# Patient Record
Sex: FEMALE | Race: WHITE | Employment: UNEMPLOYED | URBAN - METROPOLITAN AREA
[De-identification: names, ages, dates, MRNs, and addresses within clinical notes are randomized per-mention and may not be internally consistent; named-entity substitution may affect disease eponyms.]

---

## 2017-01-16 ENCOUNTER — OFFICE VISIT (OUTPATIENT)
Dept: FAMILY MEDICINE | Facility: CLINIC | Age: 32
End: 2017-01-16

## 2017-01-16 VITALS
DIASTOLIC BLOOD PRESSURE: 78 MMHG | HEART RATE: 67 BPM | BODY MASS INDEX: 21.26 KG/M2 | OXYGEN SATURATION: 97 % | SYSTOLIC BLOOD PRESSURE: 102 MMHG | WEIGHT: 120 LBS | HEIGHT: 63 IN

## 2017-01-16 DIAGNOSIS — F32.5 MAJOR DEPRESSIVE DISORDER WITH SINGLE EPISODE, IN FULL REMISSION (H): ICD-10-CM

## 2017-01-16 DIAGNOSIS — F41.9 ANXIETY: ICD-10-CM

## 2017-01-16 DIAGNOSIS — Z76.0 ENCOUNTER FOR MEDICATION REFILL: Primary | ICD-10-CM

## 2017-01-16 PROCEDURE — 99213 OFFICE O/P EST LOW 20 MIN: CPT | Performed by: FAMILY MEDICINE

## 2017-01-16 RX ORDER — MINOCYCLINE HYDROCHLORIDE 100 MG/1
100 CAPSULE ORAL PRN
Refills: 0 | COMMUNITY
Start: 2016-12-10 | End: 2017-12-14

## 2017-01-16 RX ORDER — ADAPALENE 0.1 G/100G
CREAM TOPICAL
Refills: 4 | COMMUNITY
Start: 2016-12-10 | End: 2018-08-16

## 2017-01-16 RX ORDER — BENZOYL PEROXIDE 50 MG/ML
LIQUID TOPICAL
Refills: 4 | COMMUNITY
Start: 2016-12-12 | End: 2018-08-16

## 2017-01-16 NOTE — PROGRESS NOTES
"In office for refill of Sertraline and Xanax.     Break 50s in half, take 25mg.     Use Xanax 1 pill 2-3 times a week. On Sundays, in the mornings.     Problem(s) Oriented visit      ROS:  General and Resp. completed and negative except as noted above     HISTORY:   reports that she drinks about 2.0 oz of alcohol per week.   reports that she has never smoked. She has never used smokeless tobacco.    No past medical history on file.  No past surgical history on file.    EXAM:  BP: 102/78   Pulse: 67    Temp: Data Unavailable    Wt Readings from Last 2 Encounters:   01/16/17 54.432 kg (120 lb)   09/15/15 53.978 kg (119 lb)       BMI= Body mass index is 21.26 kg/(m^2).    EXAM:  APPEARANCE: = Relaxed and in no distress  Conj/Eyelids = noninjected and lids and lashes are without inflammation  PERRLA/Irises = Pupils Round Reactive to Light and Irisis without inflammation  Ears/Nose = External structures and Nares have usual shape and form  Ear canals and TM's = Canals are not inflammed and have none or little wax and the drums are not injected and have a light reflex   Lips/Teeth/Gums = No lesions seen, good dentition, and gums seem healthy  Oropharynx = No leukoplakia, No injection to the tissues, Normal Uvula  Neck = No anterior or posterior adenopathy appreciated.  Resp effort = Calm regular breathing  Breath Sounds = Good air movement with no rales or rhonchi in any lung fields  Mood/Affect = Cooperative and interested      Assessment/Plan:  Nan was seen today for recheck medication.    Diagnoses and all orders for this visit:    Encounter for medication refill    Anxiety    Major depressive disorder with single episode, in full remission (H)        COUNSELING:   reports that she has never smoked. She has never used smokeless tobacco.    Estimated body mass index is 21.26 kg/(m^2) as calculated from the following:    Height as of this encounter: 1.6 m (5' 3\").    Weight as of this encounter: 54.432 kg (120 lb). "       Appropriate preventive services were discussed with this patient, including applicable screening as appropriate for cardiovascular disease, diabetes, osteopenia/osteoporosis, and glaucoma.  As appropriate for age/gender, discussed screening for colorectal cancer, prostate cancer, breast cancer, and cervical cancer. Checklist reviewing preventive services available has been given to the patient.    Reviewed patients plan of care and provided an AVS. The Basic Care Plan (routine screening as documented in Health Maintenance) for Nan meets the Care Plan requirement. This Care Plan has been established and reviewed with the  Patient.      The following health maintenance items are reviewed in Epic and correct as of today:  Health Maintenance   Topic Date Due     PAP SCREENING Q3 YR (SYSTEM ASSIGNED)  04/01/2015     INFLUENZA VACCINE (SYSTEM ASSIGNED)  09/01/2016     TETANUS IMMUNIZATION (SYSTEM ASSIGNED)  05/07/2023       Chico Alvarenga  [facility]  For any issues my office # is [phone]

## 2017-01-16 NOTE — MR AVS SNAPSHOT
"              After Visit Summary   1/16/2017    Nan Singleton    MRN: [id number]           Patient Information     Date Of Birth          1985        Visit Information        Provider Department      1/16/2017 9:00 AM Chico Alvarenga MD [facility]        Today's Diagnoses     Encounter for medication refill    -  1     Anxiety         Major depressive disorder with single episode, in full remission (H)            Follow-ups after your visit        Who to contact     If you have questions or need follow up information about today's clinic visit or your schedule please contact [facility] directly at [phone].  Normal or non-critical lab and imaging results will be communicated to you by MyChart, letter or phone within 4 business days after the clinic has received the results. If you do not hear from us within 7 days, please contact the clinic through [employer] or phone. If you have a critical or abnormal lab result, we will notify you by phone as soon as possible.  Submit refill requests through [employer] or call your pharmacy and they will forward the refill request to us. Please allow 3 business days for your refill to be completed.          Additional Information About Your Visit        MyChart Information     [employer] gives you secure access to your electronic health record. If you see a primary care provider, you can also send messages to your care team and make appointments. If you have questions, please call your primary care clinic.  If you do not have a primary care provider, please call [phone] and they will assist you.        Care EveryWhere ID     This is your Care EveryWhere ID. This could be used by other organizations to access your [place] medical records  [id number]        Your Vitals Were     Pulse Height BMI (Body Mass Index) Pulse Oximetry          67 1.6 m (5' 3\") 21.26 kg/m2 97%         Blood Pressure from Last 3 Encounters:   01/16/17 102/78 "   09/15/15 102/68   05/07/13 100/62    Weight from Last 3 Encounters:   01/16/17 54.432 kg (120 lb)   09/15/15 53.978 kg (119 lb)   09/22/14 54.885 kg (121 lb)              Today, you had the following     No orders found for display       Primary Care Provider Office Phone # Fax #    Chico Alvarenga [phone]       [facility] [address]  [facility] [address]        Thank you!     Thank you for choosing [facility]  for your care. Our goal is always to provide you with excellent care. Hearing back from our patients is one way we can continue to improve our services. Please take a few minutes to complete the written survey that you may receive in the mail after your visit with us. Thank you!             Your Updated Medication List - Protect others around you: Learn how to safely use, store and throw away your medicines at www.disposemymeds.org.          This list is accurate as of: 1/16/17  9:31 AM.  Always use your most recent med list.                   Brand Name Dispense Instructions for use    adapalene 0.1 % cream    DIFFERIN     APPLY TO THE AFFECTED AREAS OF THE SKIN QHS       ALPRAZolam 0.5 MG tablet    XANAX    30 tablet    TAKE 1 TABLET BY MOUTH THREE TIMES DAILY AS NEEDED FOR ANXIETY       BENZOYL PEROXIDE WASH 5 % Liqd   Generic drug:  benzoyl peroxide      WASH THE AFFECTED SKIN AREAS GENTLY BID       levonorgestrel 20 MCG/24HR IUD    MIRENA     1 each by Intrauterine route once       minocycline 100 MG capsule    MINOCIN/DYNACIN         sertraline 50 MG tablet    ZOLOFT    30 tablet    TAKE 1 TABLET BY MOUTH EVERY DAY

## 2017-01-17 ASSESSMENT — PATIENT HEALTH QUESTIONNAIRE - PHQ9: SUM OF ALL RESPONSES TO PHQ QUESTIONS 1-9: 5

## 2017-03-27 ENCOUNTER — OFFICE VISIT (OUTPATIENT)
Dept: FAMILY MEDICINE | Facility: CLINIC | Age: 32
End: 2017-03-27

## 2017-03-27 VITALS
DIASTOLIC BLOOD PRESSURE: 64 MMHG | SYSTOLIC BLOOD PRESSURE: 102 MMHG | OXYGEN SATURATION: 99 % | RESPIRATION RATE: 16 BRPM | HEART RATE: 74 BPM

## 2017-03-27 DIAGNOSIS — F33.1 MODERATE EPISODE OF RECURRENT MAJOR DEPRESSIVE DISORDER (H): Primary | ICD-10-CM

## 2017-03-27 DIAGNOSIS — F41.9 ANXIETY: ICD-10-CM

## 2017-03-27 PROCEDURE — 99214 OFFICE O/P EST MOD 30 MIN: CPT | Performed by: FAMILY MEDICINE

## 2017-03-27 RX ORDER — ALPRAZOLAM 0.5 MG
0.5 TABLET ORAL 3 TIMES DAILY PRN
Qty: 60 TABLET | Refills: 1 | Status: SHIPPED | OUTPATIENT
Start: 2017-03-27 | End: 2017-11-16

## 2017-03-27 RX ORDER — CLINDAMYCIN PHOSPHATE 10 MG/ML
SOLUTION TOPICAL
Refills: 4 | COMMUNITY
Start: 2017-03-12 | End: 2017-12-14

## 2017-03-27 ASSESSMENT — ANXIETY QUESTIONNAIRES
IF YOU CHECKED OFF ANY PROBLEMS ON THIS QUESTIONNAIRE, HOW DIFFICULT HAVE THESE PROBLEMS MADE IT FOR YOU TO DO YOUR WORK, TAKE CARE OF THINGS AT HOME, OR GET ALONG WITH OTHER PEOPLE: SOMEWHAT DIFFICULT
7. FEELING AFRAID AS IF SOMETHING AWFUL MIGHT HAPPEN: NEARLY EVERY DAY
1. FEELING NERVOUS, ANXIOUS, OR ON EDGE: NEARLY EVERY DAY
3. WORRYING TOO MUCH ABOUT DIFFERENT THINGS: NEARLY EVERY DAY
GAD7 TOTAL SCORE: 18
5. BEING SO RESTLESS THAT IT IS HARD TO SIT STILL: MORE THAN HALF THE DAYS
2. NOT BEING ABLE TO STOP OR CONTROL WORRYING: NEARLY EVERY DAY
6. BECOMING EASILY ANNOYED OR IRRITABLE: SEVERAL DAYS

## 2017-03-27 ASSESSMENT — PATIENT HEALTH QUESTIONNAIRE - PHQ9: 5. POOR APPETITE OR OVEREATING: NEARLY EVERY DAY

## 2017-03-27 NOTE — MR AVS SNAPSHOT
After Visit Summary   3/27/2017    Nan Singleton    MRN: [id number]           Patient Information     Date Of Birth          1985        Visit Information        Provider Department      3/27/2017 4:15 PM Chico Alvarenga MD [facility]        Today's Diagnoses     Moderate episode of recurrent major depressive disorder (H)    -  1    Anxiety           Follow-ups after your visit        Who to contact     If you have questions or need follow up information about today's clinic visit or your schedule please contact [facility] directly at [phone].  Normal or non-critical lab and imaging results will be communicated to you by HungerTimehart, letter or phone within 4 business days after the clinic has received the results. If you do not hear from us within 7 days, please contact the clinic through [employer] or phone. If you have a critical or abnormal lab result, we will notify you by phone as soon as possible.  Submit refill requests through [employer] or call your pharmacy and they will forward the refill request to us. Please allow 3 business days for your refill to be completed.          Additional Information About Your Visit        MyChart Information     [employer] gives you secure access to your electronic health record. If you see a primary care provider, you can also send messages to your care team and make appointments. If you have questions, please call your primary care clinic.  If you do not have a primary care provider, please call [phone] and they will assist you.        Care EveryWhere ID     This is your Care EveryWhere ID. This could be used by other organizations to access your [place] medical records  [id number]        Your Vitals Were     Pulse Respirations Pulse Oximetry             74 16 99%          Blood Pressure from Last 3 Encounters:   03/27/17 102/64   01/16/17 102/78   09/15/15 102/68    Weight from Last 3 Encounters:   01/16/17 54.4 kg (120  lb)   09/15/15 54 kg (119 lb)   09/22/14 54.9 kg (121 lb)              Today, you had the following     No orders found for display         Today's Medication Changes          These changes are accurate as of: 3/27/17  7:20 PM.  If you have any questions, ask your nurse or doctor.               These medicines have changed or have updated prescriptions.        Dose/Directions    ALPRAZolam 0.5 MG tablet   Commonly known as:  XANAX   This may have changed:  See the new instructions.   Used for:  Moderate episode of recurrent major depressive disorder (H)   Changed by:  Chico Alvarenga MD        Dose:  0.5 mg   Take 1 tablet (0.5 mg) by mouth 3 times daily as needed for anxiety   Quantity:  60 tablet   Refills:  1       sertraline 50 MG tablet   Commonly known as:  ZOLOFT   This may have changed:  See the new instructions.   Used for:  Anxiety   Changed by:  Chico Alvarenga MD        Dose:  50 mg   Take 1 tablet (50 mg) by mouth daily   Quantity:  30 tablet   Refills:  11            Where to get your medicines      These medications were sent to [facility] [address] [place] [address] [employer] AT [facility] OF [facility] [address]  [address], [facility] [address]     Phone:  [phone]     sertraline 50 MG tablet         Some of these will need a paper prescription and others can be bought over the counter.  Ask your nurse if you have questions.     Bring a paper prescription for each of these medications     ALPRAZolam 0.5 MG tablet                Primary Care Provider Office Phone # Fax #    Chico Alvarenga [phone]       [facility] [address]  [facility] [address]        Thank you!     Thank you for choosing [facility]  for your care. Our goal is always to provide you with excellent care. Hearing back from our patients is one way we can continue to improve our services. Please take a few minutes to complete the written survey  that you may receive in the mail after your visit with us. Thank you!             Your Updated Medication List - Protect others around you: Learn how to safely use, store and throw away your medicines at www.disposemymeds.org.          This list is accurate as of: 3/27/17  7:20 PM.  Always use your most recent med list.                   Brand Name Dispense Instructions for use    adapalene 0.1 % cream    DIFFERIN     APPLY TO THE AFFECTED AREAS OF THE SKIN QHS       ALPRAZolam 0.5 MG tablet    XANAX    60 tablet    Take 1 tablet (0.5 mg) by mouth 3 times daily as needed for anxiety       BENZOYL PEROXIDE WASH 5 % Liqd   Generic drug:  benzoyl peroxide      WASH THE AFFECTED SKIN AREAS GENTLY BID       clindamycin 1 % Swab    CLEOCIN T     USE ONE PLEDGET ON THE AFFECTED SKIN AREAS BID       levonorgestrel 20 MCG/24HR IUD    MIRENA     1 each by Intrauterine route once       minocycline 100 MG capsule    MINOCIN/DYNACIN     Take 100 mg by mouth as needed       sertraline 50 MG tablet    ZOLOFT    30 tablet    Take 1 tablet (50 mg) by mouth daily

## 2017-03-27 NOTE — PROGRESS NOTES
Problem(s) Oriented visit        SUBJECTIVE:                                                    Nan Singleton is a 32 year old female who presents to clinic today for the following health issues :      1. Anxiety  Crying about work stress    - ALPRAZolam (XANAX) 0.5 MG tablet; Take 1 tablet (0.5 mg) by mouth 3 times daily as needed for anxiety  Dispense: 60 tablet; Refill: 1  2. Moderate episode of recurrent major depressive disorder (H)  Depression:  Has known history of depression.  Has been on medication for this.  The patient does not report any significant side effects of this medication.  The prior symptoms leading to the original diagnosis and decision to start medication therapy are better.     Appetite is stable.  Sleeping patterns are stable.  No reported thoughts of suicide or homicide.  REVFSREFRESH(342:3)  Last PHQ-9 score on record=   PHQ-9 SCORE 3/27/2017   Total Score -   Total Score 9          - sertraline (ZOLOFT) 50 MG tablet; Take 1 tablet (50 mg) by mouth daily  Dispense: 30 tablet; Refill: 11      Problem list, Medication list, Allergies, and Medical/Social/Surgical histories reviewed in [facility] and updated as appropriate.   Additional history: as documented    ROS:  General and Resp. completed and negative except as noted above    Histories:   Patient Active Problem List   Diagnosis     Anxiety     No past surgical history on file.    Social History   Substance Use Topics     Smoking status: Never Smoker     Smokeless tobacco: Never Used     Alcohol use 2.0 oz/week     4 drink(s) per week     No family history on file.        OBJECTIVE:                                                    /64  Pulse 74  Resp 16  SpO2 99%  There is no height or weight on file to calculate BMI.   APPEARANCE: = alert, moderate distress and crying     ASSESSMENT/PLAN:                                                        Nan was seen today for anxiety.    Diagnoses and all orders for this  visit:    Moderate episode of recurrent major depressive disorder (H)  -     ALPRAZolam (XANAX) 0.5 MG tablet; Take 1 tablet (0.5 mg) by mouth 3 times daily as needed for anxiety    Anxiety  -     sertraline (ZOLOFT) 50 MG tablet; Take 1 tablet (50 mg) by mouth daily        >25 min spent with patient, greater than 50% spent on discussion/education/planning, etc. About The primary encounter diagnosis was Moderate episode of recurrent major depressive disorder (H). A diagnosis of Anxiety was also pertinent to this visit.    Recheck in a month    The following health maintenance items are reviewed in Epic and correct as of today:  Health Maintenance   Topic Date Due     PAP SCREENING Q3 YR (SYSTEM ASSIGNED)  04/01/2015     INFLUENZA VACCINE (SYSTEM ASSIGNED)  09/01/2017     TETANUS IMMUNIZATION (SYSTEM ASSIGNED)  05/07/2023       Chico Alvarenga MD  [facility]  Family Practice  [facility]  [phone]    For any issues my office # is [phone]

## 2017-03-28 ASSESSMENT — PATIENT HEALTH QUESTIONNAIRE - PHQ9: SUM OF ALL RESPONSES TO PHQ QUESTIONS 1-9: 9

## 2017-03-28 ASSESSMENT — ANXIETY QUESTIONNAIRES: GAD7 TOTAL SCORE: 18

## 2017-06-10 ENCOUNTER — HEALTH MAINTENANCE LETTER (OUTPATIENT)
Age: 32
End: 2017-06-10

## 2017-11-16 DIAGNOSIS — F33.1 MODERATE EPISODE OF RECURRENT MAJOR DEPRESSIVE DISORDER (H): ICD-10-CM

## 2017-11-16 NOTE — TELEPHONE ENCOUNTER
ALPRAZolam (XANAX) 0.5 MG tablet last OV - 3/27/17 last labs 1/31/13  Overdue for f/up per 03/27/2017 notes - no appt scheduled  Amaya Ruffin MA November 16, 2017 1:49 PM      PHQ-9 SCORE 9/15/2015 1/16/2017 3/27/2017   Total Score - - -   Total Score 5 5 9

## 2017-11-17 RX ORDER — ALPRAZOLAM 0.5 MG
TABLET ORAL
Qty: 60 TABLET | Refills: 0 | Status: SHIPPED | OUTPATIENT
Start: 2017-11-17 | End: 2017-12-14

## 2017-12-14 ENCOUNTER — OFFICE VISIT (OUTPATIENT)
Dept: FAMILY MEDICINE | Facility: CLINIC | Age: 32
End: 2017-12-14

## 2017-12-14 VITALS
BODY MASS INDEX: 21.26 KG/M2 | WEIGHT: 120 LBS | SYSTOLIC BLOOD PRESSURE: 104 MMHG | HEART RATE: 76 BPM | DIASTOLIC BLOOD PRESSURE: 62 MMHG

## 2017-12-14 DIAGNOSIS — F41.9 ANXIETY: ICD-10-CM

## 2017-12-14 DIAGNOSIS — F33.1 MODERATE EPISODE OF RECURRENT MAJOR DEPRESSIVE DISORDER (H): ICD-10-CM

## 2017-12-14 PROCEDURE — 99213 OFFICE O/P EST LOW 20 MIN: CPT | Performed by: FAMILY MEDICINE

## 2017-12-14 RX ORDER — ALPRAZOLAM 0.5 MG
TABLET ORAL
Qty: 60 TABLET | Refills: 0 | Status: SHIPPED | OUTPATIENT
Start: 2017-02-14 | End: 2018-07-04

## 2017-12-14 RX ORDER — SPIRONOLACTONE 25 MG/1
25 TABLET ORAL 3 TIMES DAILY
Refills: 1 | COMMUNITY
Start: 2017-10-30 | End: 2018-08-16

## 2017-12-14 ASSESSMENT — PATIENT HEALTH QUESTIONNAIRE - PHQ9: SUM OF ALL RESPONSES TO PHQ QUESTIONS 1-9: 4

## 2017-12-14 NOTE — MR AVS SNAPSHOT
After Visit Summary   12/14/2017    Nan Singleton    MRN: [id number]           Patient Information     Date Of Birth          1985        Visit Information        Provider Department      12/14/2017 12:00 PM Chico Alvarenga MD [facility]        Today's Diagnoses     Anxiety        Moderate episode of recurrent major depressive disorder (H)           Follow-ups after your visit        Who to contact     If you have questions or need follow up information about today's clinic visit or your schedule please contact [facility] directly at [phone].  Normal or non-critical lab and imaging results will be communicated to you by Vittanahart, letter or phone within 4 business days after the clinic has received the results. If you do not hear from us within 7 days, please contact the clinic through MIL[employer] or phone. If you have a critical or abnormal lab result, we will notify you by phone as soon as possible.  Submit refill requests through [employer]D or call your pharmacy and they will forward the refill request to us. Please allow 3 business days for your refill to be completed.          Additional Information About Your Visit        MyChart Information     [employer] gives you secure access to your electronic health record. If you see a primary care provider, you can also send messages to your care team and make appointments. If you have questions, please call your primary care clinic.  If you do not have a primary care provider, please call [phone] and they will assist you.        Care EveryWhere ID     This is your Care EveryWhere ID. This could be used by other organizations to access your [place] medical records  [id number]        Your Vitals Were     Pulse Breastfeeding? BMI (Body Mass Index)             76 No 21.26 kg/m2          Blood Pressure from Last 3 Encounters:   12/14/17 104/62   03/27/17 102/64   01/16/17 102/78    Weight from Last 3 Encounters:    12/14/17 54.4 kg (120 lb)   01/16/17 54.4 kg (120 lb)   09/15/15 54 kg (119 lb)              Today, you had the following     No orders found for display         Today's Medication Changes          These changes are accurate as of: 12/14/17 12:41 PM.  If you have any questions, ask your nurse or doctor.               These medicines have changed or have updated prescriptions.        Dose/Directions    ALPRAZolam 0.5 MG tablet   Commonly known as:  XANAX   This may have changed:  See the new instructions.   Used for:  Moderate episode of recurrent major depressive disorder (H)   Changed by:  Chico Alvarenga MD        TAKE 1 TABLET BY MOUTH THREE TIMES DAILY AS NEEDED FOR ANXIETY   Quantity:  60 tablet   Refills:  0            Where to get your medicines      These medications were sent to [facility] [address] [place] [address] [address] AT [facility] OF [facility] [address] & [facility]  [address] , [facility] [address]     Phone:  [phone]     sertraline 50 MG tablet         Some of these will need a paper prescription and others can be bought over the counter.  Ask your nurse if you have questions.     Bring a paper prescription for each of these medications     ALPRAZolam 0.5 MG tablet                Primary Care Provider Office Phone # Fax #    Chico Alvarenga [phone] [address]  [facility] [address]        Equal Access to Services     [facility] AH: Hadii aad ku hadasho Soomaali, waaxda luqadaha, qaybta kaalmada adeegyada, carroll vera hayisabel galicia ah. So [facility] [phone].    ATENCIÓN: Si habla español, tiene a meier disposición servicios gratuitos de asistencia lingüística. Llame al [phone].    We comply with applicable federal civil rights laws and [place] laws. We do not discriminate on the basis of race, color, national origin, age, disability, sex, sexual orientation, or gender identity.            Thank you!     Thank you for  choosing [facility]  for your care. Our goal is always to provide you with excellent care. Hearing back from our patients is one way we can continue to improve our services. Please take a few minutes to complete the written survey that you may receive in the mail after your visit with us. Thank you!             Your Updated Medication List - Protect others around you: Learn how to safely use, store and throw away your medicines at www.disposemymeds.org.          This list is accurate as of: 12/14/17 12:41 PM.  Always use your most recent med list.                   Brand Name Dispense Instructions for use Diagnosis    adapalene 0.1 % cream    DIFFERIN     APPLY TO THE AFFECTED AREAS OF THE SKIN QHS        ALPRAZolam 0.5 MG tablet    XANAX    60 tablet    TAKE 1 TABLET BY MOUTH THREE TIMES DAILY AS NEEDED FOR ANXIETY    Moderate episode of recurrent major depressive disorder (H)       BENZOYL PEROXIDE WASH 5 % Liqd   Generic drug:  benzoyl peroxide      WASH THE AFFECTED SKIN AREAS GENTLY BID        levonorgestrel 20 MCG/24HR IUD    MIRENA     1 each by Intrauterine route once        sertraline 50 MG tablet    ZOLOFT    30 tablet    Take 1 tablet (50 mg) by mouth daily    Anxiety       spironolactone 25 MG tablet    ALDACTONE     Take 25 mg by mouth 3 times daily 50 mg in the am and 25 mg in the pm

## 2017-12-14 NOTE — PROGRESS NOTES
Depression:  Has known history of depression.  Has been on medication for this.  The patient does not report any significant side effects of this medication.  The prior symptoms leading to the original diagnosis and decision to start medication therapy are better.     Appetite is stable.  Sleeping patterns are stable.  No reported thoughts of suicide or homicide.    Last 3 PHQ9 results:  PHQ-9 SCORE 9/15/2015 1/16/2017 3/27/2017 12/14/2017   Total Score - - - -   Total Score 5 5 9 4     Doing much improved.    Assessment/Plan:  Nan was seen today for medication follow-up.    Diagnoses and all orders for this visit:    Anxiety  -     sertraline (ZOLOFT) 50 MG tablet; Take 1 tablet (50 mg) by mouth daily    Moderate episode of recurrent major depressive disorder (H)  -     ALPRAZolam (XANAX) 0.5 MG tablet; TAKE 1 TABLET BY MOUTH THREE TIMES DAILY AS NEEDED FOR ANXIETY      Chico Alvarenga MD  [facility]  [phone]

## 2018-07-30 ENCOUNTER — TELEPHONE (OUTPATIENT)
Dept: FAMILY MEDICINE | Facility: CLINIC | Age: 33
End: 2018-07-30

## 2018-07-30 NOTE — TELEPHONE ENCOUNTER
Patient calls stating just saw her therapist (Zora Novak) who suggested might benefit from increasing her sertraline from 25mg QD to 50mg QD. Zora recommended patient call Dr. Alvarenga to ask if ok to make this change. Patient reports feeling more anxious lately.   Patient was last seen 12/2017 by Dr. Alvarenga for her anxiety and has med check appt with him scheduled for 8/16/18.   Per med list, Dr. Alvarenga has been ordering sertraline 50mg QD. Patient confirms this is dose on rx but been taking 1/2 tab for while now.   Plan: per Dr. Alvarenga, ok to increase to 50mg QD again and f/u at appt next month. Patient to call us sooner if any questions/concerns.  Gretta Luevano RN

## 2018-08-16 ENCOUNTER — OFFICE VISIT (OUTPATIENT)
Dept: FAMILY MEDICINE | Facility: CLINIC | Age: 33
End: 2018-08-16

## 2018-08-16 VITALS
WEIGHT: 118 LBS | BODY MASS INDEX: 20.9 KG/M2 | RESPIRATION RATE: 16 BRPM | HEART RATE: 91 BPM | OXYGEN SATURATION: 99 % | DIASTOLIC BLOOD PRESSURE: 64 MMHG | SYSTOLIC BLOOD PRESSURE: 112 MMHG

## 2018-08-16 DIAGNOSIS — F32.0 MILD SINGLE CURRENT EPISODE OF MAJOR DEPRESSIVE DISORDER (H): ICD-10-CM

## 2018-08-16 DIAGNOSIS — F43.22 ADJUSTMENT DISORDER WITH ANXIOUS MOOD: Primary | ICD-10-CM

## 2018-08-16 PROCEDURE — 99214 OFFICE O/P EST MOD 30 MIN: CPT | Performed by: FAMILY MEDICINE

## 2018-08-16 RX ORDER — CLINDAMYCIN PHOSPHATE 10 UG/ML
LOTION TOPICAL
Refills: 1 | COMMUNITY
Start: 2018-06-22

## 2018-08-16 RX ORDER — LORAZEPAM 0.5 MG/1
0.5 TABLET ORAL EVERY 8 HOURS PRN
Qty: 20 TABLET | Refills: 0 | Status: SHIPPED | OUTPATIENT
Start: 2018-08-16 | End: 2018-10-09

## 2018-08-16 RX ORDER — TRETINOIN 0.5 MG/G
CREAM TOPICAL
Refills: 3 | COMMUNITY
Start: 2018-06-22

## 2018-08-16 RX ORDER — CLONAZEPAM 0.5 MG/1
TABLET ORAL
COMMUNITY
Start: 2018-07-31

## 2018-08-16 ASSESSMENT — ANXIETY QUESTIONNAIRES
GAD7 TOTAL SCORE: 11
7. FEELING AFRAID AS IF SOMETHING AWFUL MIGHT HAPPEN: MORE THAN HALF THE DAYS
6. BECOMING EASILY ANNOYED OR IRRITABLE: SEVERAL DAYS
1. FEELING NERVOUS, ANXIOUS, OR ON EDGE: MORE THAN HALF THE DAYS
5. BEING SO RESTLESS THAT IT IS HARD TO SIT STILL: NOT AT ALL
2. NOT BEING ABLE TO STOP OR CONTROL WORRYING: MORE THAN HALF THE DAYS
3. WORRYING TOO MUCH ABOUT DIFFERENT THINGS: MORE THAN HALF THE DAYS

## 2018-08-16 ASSESSMENT — PATIENT HEALTH QUESTIONNAIRE - PHQ9: 5. POOR APPETITE OR OVEREATING: MORE THAN HALF THE DAYS

## 2018-08-16 NOTE — PROGRESS NOTES
"Depression:  Has known history of depression.  Has been on medication for this.  The patient does not report any significant side effects of this medication.  The prior symptoms leading to the original diagnosis and decision to start medication therapy are better.     Appetite is stable.  Sleeping patterns are stable.  No reported thoughts of suicide or homicide.    Last 3 PHQ9 results:  PHQ-9 SCORE 1/16/2017 3/27/2017 12/14/2017 8/16/2018   Total Score - - - -   Total Score 5 9 4 10     ANXIETY:  Has known history of anxiety and has been on medication for this.  The patient does not report any significant side effects of this medication.  The prior symptoms leading to the original diagnosis and decision to start medication therapy are better.     Appetite is stable.  Sleeping patterns are stable.    Overall, the level of \"racing thoughts\", emotional lability, and ease of agitation are better.    No recent panic attacks.    IRENE-7 SCORE 9/15/2015 3/27/2017 8/16/2018   Total Score 7 18 11       Lots of recent stress due to death of  of the friend.   Not great reaction to mario  Problem(s) Oriented visit      ROS:  General and Resp. completed and negative except as noted above     HISTORY:   reports that she drinks about 2.0 oz of alcohol per week    reports that she has never smoked. She has never used smokeless tobacco.    No past medical history on file.  No past surgical history on file.    EXAM:  BP: 112/64   Pulse: 91    Temp: Data Unavailable    Wt Readings from Last 2 Encounters:   08/16/18 53.5 kg (118 lb)   12/14/17 54.4 kg (120 lb)       BMI= Body mass index is 20.9 kg/(m^2).    EXAM:  APPEARANCE: = Relaxed and in no distress  Conj/Eyelids = noninjected and lids and lashes are without inflammation  PERRLA/Irises = Pupils Round Reactive to Light and Irisis without inflammation  Neck = No anterior or posterior adenopathy appreciated.  Thyroid = Not enlarged and no masses felt  Resp effort = Calm " "regular breathing  Breath Sounds = Good air movement with no rales or rhonchi in any lung fields  Heart Rate, Rythym, & sounds (no Murm)  = Regular rate and rythym with no S3, S4, or murmer appreciated.  Carotid Art's = Pulses full and equal and no bruits appreciated  Abdomen = Soft, nontender, no masses, & bowel sounds in all quadrants  Liver/Spleen = Normal span and no splenomegaly noted  Digits and Nails = FROM in all finger joints, no nail dystrophy  Ext (edema) = No pretibial edema noted or elsewhere  Musculsktl =  Strength and ROM of major joints are within normal limits  SKIN = absent significant rashes or lesions   Recent/Remote Memory = Alert and Oriented x 3  Mood/Affect = Cooperative and interested      Assessment/Plan:  Nan was seen today for recheck medication.    Diagnoses and all orders for this visit:    Adjustment disorder with anxious mood  -     LORazepam (ATIVAN) 0.5 MG tablet; Take 1 tablet (0.5 mg) by mouth every 8 hours as needed for anxiety    Mild single current episode of major depressive disorder (H)    >25 min spent with patient, greater than 50% spent on discussion/education/planning, etc. About The primary encounter diagnosis was Adjustment disorder with anxious mood. A diagnosis of Mild single current episode of major depressive disorder (H) was also pertinent to this visit.        COUNSELING:   reports that she has never smoked. She has never used smokeless tobacco.    Estimated body mass index is 20.9 kg/(m^2) as calculated from the following:    Height as of 1/16/17: 1.6 m (5' 3\").    Weight as of this encounter: 53.5 kg (118 lb).       Appropriate preventive services were discussed with this patient, including applicable screening as appropriate for cardiovascular disease, diabetes, osteopenia/osteoporosis, and glaucoma.  As appropriate for age/gender, discussed screening for colorectal cancer, prostate cancer, breast cancer, and cervical cancer. Checklist reviewing preventive " services available has been given to the patient.    Reviewed patients plan of care and provided an AVS. The Basic Care Plan (routine screening as documented in Health Maintenance) for Nan meets the Care Plan requirement. This Care Plan has been established and reviewed with the  Patient.      The following health maintenance items are reviewed in Epic and correct as of today:  Health Maintenance   Topic Date Due     DEPRESSION ACTION PLAN Q1 YR  02/23/2005     HIV SCREEN (SYSTEM ASSIGNED)  02/23/2005     PAP SCREENING Q3 YR (SYSTEM ASSIGNED)  04/01/2015     PHQ-9 Q6 MONTHS  06/14/2018     INFLUENZA VACCINE (1) 09/01/2018     TETANUS IMMUNIZATION (SYSTEM ASSIGNED)  05/07/2023       Chico Alvarenga  [facility]  For any issues my office # is [phone]

## 2018-08-16 NOTE — LETTER
My Depression Action Plan  Name: Nan Singleton   Date of Birth 1985  Date: 8/16/2018    My doctor: Chico Alvarenga   My clinic: [facility] [address] [place] [address] [phone]          GREEN    ZONE   Good Control    What it looks like:     Things are going generally well. You have normal up s and down s. You may even feel depressed from time to time, but bad moods usually last less than a day.   What you need to do:  1. Continue to care for yourself (see self care plan)  2. Check your depression survival kit and update it as needed  3. Follow your physician s recommendations including any medication.  4. Do not stop taking medication unless you consult with your physician first.           YELLOW         ZONE Getting Worse    What it looks like:     Depression is starting to interfere with your life.     It may be hard to get out of bed; you may be starting to isolate yourself from others.    Symptoms of depression are starting to last most all day and this has happened for several days.     You may have suicidal thoughts but they are not constant.   What you need to do:     1. Call your care team, your response to treatment will improve if you keep your care team informed of your progress. Yellow periods are signs an adjustment may need to be made.     2. Continue your self-care, even if you have to fake it!    3. Talk to someone in your support network    4. Open up your depression survival kit           RED    ZONE Medical Alert - Get Help    What it looks like:     Depression is seriously interfering with your life.     You may experience these or other symptoms: You can t get out of bed most days, can t work or engage in other necessary activities, you have trouble taking care of basic hygiene, or basic responsibilities, thoughts of suicide or death that will not go away, self-injurious behavior.     What you need to do:  1. Call your care team and  request a same-day appointment. If they are not available (weekends or after hours) call your local crisis line, emergency room or 911.            Depression Self Care Plan / Survival Kit    Self-Care for Depression  Here s the deal. Your body and mind are really not as separate as most people think.  What you do and think affects how you feel and how you feel influences what you do and think. This means if you do things that people who feel good do, it will help you feel better.  Sometimes this is all it takes.  There is also a place for medication and therapy depending on how severe your depression is, so be sure to consult with your medical provider and/ or Behavioral Health Consultant if your symptoms are worsening or not improving.     In order to better manage my stress, I will:    Exercise  Get some form of exercise, every day. This will help reduce pain and release endorphins, the  feel good  chemicals in your brain. This is almost as good as taking antidepressants!  This is not the same as joining a gym and then never going! (they count on that by the way ) It can be as simple as just going for a walk or doing some gardening, anything that will get you moving.      Hygiene   Maintain good hygiene (Get out of bed in the morning, Make your bed, Brush your teeth, Take a shower, and Get dressed like you were going to work, even if you are unemployed).  If your clothes don't fit try to get ones that do.    Diet  I will strive to eat foods that are good for me, drink plenty of water, and avoid excessive sugar, caffeine, alcohol, and other mood-altering substances.  Some foods that are helpful in depression are: complex carbohydrates, B vitamins, flaxseed, fish or fish oil, fresh fruits and vegetables.    Psychotherapy  I agree to participate in Individual Therapy (if recommended).    Medication  If prescribed medications, I agree to take them.  Missing doses can result in serious side effects.  I understand that  drinking alcohol, or other illicit drug use, may cause potential side effects.  I will not stop my medication abruptly without first discussing it with my provider.    Staying Connected With Others  I will stay in touch with my friends, family members, and my primary care provider/team.    Use your imagination  Be creative.  We all have a creative side; it doesn t matter if it s oil painting, sand castles, or mud pies! This will also kick up the endorphins.    Witness Beauty  (AKA stop and smell the roses) Take a look outside, even in mid-winter. Notice colors, textures. Watch the squirrels and birds.     Service to others  Be of service to others.  There is always someone else in need.  By helping others we can  get out of ourselves  and remember the really important things.  This also provides opportunities for practicing all the other parts of the program.    Humor  Laugh and be silly!  Adjust your TV habits for less news and crime-drama and more comedy.    Control your stress  Try breathing deep, massage therapy, biofeedback, and meditation. Find time to relax each day.     My support system    Clinic Contact:  Phone number:    Contact 1:  Phone number:    Contact 2:  Phone number:    Alevism/:  Phone number:    Therapist:  Phone number:    Local crisis center:    Phone number:    Other community support:  Phone number:

## 2018-08-16 NOTE — MR AVS SNAPSHOT
After Visit Summary   8/16/2018    Nan Singleton    MRN: [id number]           Patient Information     Date Of Birth          1985        Visit Information        Provider Department      8/16/2018 12:00 PM Chico Alvarenga MD [facility]        Today's Diagnoses     Adjustment disorder with anxious mood    -  1    Mild single current episode of major depressive disorder (H)           Follow-ups after your visit        Who to contact     If you have questions or need follow up information about today's clinic visit or your schedule please contact [facility] directly at [phone].  Normal or non-critical lab and imaging results will be communicated to you by HackPadhart, letter or phone within 4 business days after the clinic has received the results. If you do not hear from us within 7 days, please contact the clinic through LiquidCool Solutionst or phone. If you have a critical or abnormal lab result, we will notify you by phone as soon as possible.  Submit refill requests through Lucky Sort or call your pharmacy and they will forward the refill request to us. Please allow 3 business days for your refill to be completed.          Additional Information About Your Visit        MyChart Information     Lucky Sort gives you secure access to your electronic health record. If you see a primary care provider, you can also send messages to your care team and make appointments. If you have questions, please call your primary care clinic.  If you do not have a primary care provider, please call [phone] and they will assist you.        Care EveryWhere ID     This is your Care EveryWhere ID. This could be used by other organizations to access your [facility] medical records  [id number]        Your Vitals Were     Pulse Respirations Pulse Oximetry BMI (Body Mass Index)          91 16 99% 20.9 kg/m2         Blood Pressure from Last 3 Encounters:   08/16/18 112/64   12/14/17 104/62   03/27/17  102/64    Weight from Last 3 Encounters:   08/16/18 53.5 kg (118 lb)   12/14/17 54.4 kg (120 lb)   01/16/17 54.4 kg (120 lb)              Today, you had the following     No orders found for display         Today's Medication Changes          These changes are accurate as of 8/16/18 12:45 PM.  If you have any questions, ask your nurse or doctor.               Start taking these medicines.        Dose/Directions    LORazepam 0.5 MG tablet   Commonly known as:  ATIVAN   Used for:  Adjustment disorder with anxious mood   Started by:  Chico Alvarenga MD        Dose:  0.5 mg   Take 1 tablet (0.5 mg) by mouth every 8 hours as needed for anxiety   Quantity:  20 tablet   Refills:  0            Where to get your medicines      Some of these will need a paper prescription and others can be bought over the counter.  Ask your nurse if you have questions.     Bring a paper prescription for each of these medications     LORazepam 0.5 MG tablet                Primary Care Provider Office Phone # Fax #    Chico Alvarenga [phone] [address]  [facility] [address]        Equal Access to Services     [facility]: Hadii steve ku hadasho Sorufusali, waaxda luqadaha, qaybta kaalmada adelaceyyakianna, carroll galicia . So [facility] [phone].    ATENCIÓN: Si habla español, tiene a meier disposición servicios gratuitos de asistencia lingüística. Elizabeth al [phone].    We comply with applicable federal civil rights laws and [place] laws. We do not discriminate on the basis of race, color, national origin, age, disability, sex, sexual orientation, or gender identity.            Thank you!     Thank you for choosing [facility]  for your care. Our goal is always to provide you with excellent care. Hearing back from our patients is one way we can continue to improve our services. Please take a few minutes to complete the written survey that you may receive in the mail  after your visit with us. Thank you!             Your Updated Medication List - Protect others around you: Learn how to safely use, store and throw away your medicines at www.disposemymeds.org.          This list is accurate as of 8/16/18 12:45 PM.  Always use your most recent med list.                   Brand Name Dispense Instructions for use Diagnosis    clindamycin 1 % lotion    CLEOCIN T     USE PRN FOR THE FACE        clonazePAM 0.5 MG tablet    klonoPIN          LORazepam 0.5 MG tablet    ATIVAN    20 tablet    Take 1 tablet (0.5 mg) by mouth every 8 hours as needed for anxiety    Adjustment disorder with anxious mood       sertraline 50 MG tablet    ZOLOFT    30 tablet    Take 1 tablet (50 mg) by mouth daily    Anxiety       tretinoin 0.05 % cream    RETIN-A     USE Q OTHER NIGHT AT BEDTIME TO FACE

## 2018-08-17 ASSESSMENT — ANXIETY QUESTIONNAIRES: GAD7 TOTAL SCORE: 11

## 2018-08-17 ASSESSMENT — PATIENT HEALTH QUESTIONNAIRE - PHQ9: SUM OF ALL RESPONSES TO PHQ QUESTIONS 1-9: 10

## 2018-10-09 ENCOUNTER — OFFICE VISIT (OUTPATIENT)
Dept: FAMILY MEDICINE | Facility: CLINIC | Age: 33
End: 2018-10-09

## 2018-10-09 VITALS
WEIGHT: 121.6 LBS | DIASTOLIC BLOOD PRESSURE: 74 MMHG | HEIGHT: 63 IN | HEART RATE: 76 BPM | SYSTOLIC BLOOD PRESSURE: 108 MMHG | BODY MASS INDEX: 21.55 KG/M2

## 2018-10-09 DIAGNOSIS — F41.9 ANXIETY: ICD-10-CM

## 2018-10-09 DIAGNOSIS — F43.22 ADJUSTMENT DISORDER WITH ANXIOUS MOOD: ICD-10-CM

## 2018-10-09 PROCEDURE — 99214 OFFICE O/P EST MOD 30 MIN: CPT | Performed by: FAMILY MEDICINE

## 2018-10-09 RX ORDER — LORAZEPAM 0.5 MG/1
0.5 TABLET ORAL EVERY 8 HOURS PRN
Qty: 20 TABLET | Refills: 0 | Status: SHIPPED | OUTPATIENT
Start: 2018-10-09

## 2018-10-09 NOTE — MR AVS SNAPSHOT
"              After Visit Summary   10/9/2018    Nan Singleton    MRN: [id number]           Patient Information     Date Of Birth          1985        Visit Information        Provider Department      10/9/2018 2:00 PM Chico Alvarenga MD [facility]        Today's Diagnoses     Anxiety        Adjustment disorder with anxious mood           Follow-ups after your visit        Who to contact     If you have questions or need follow up information about today's clinic visit or your schedule please contact [facility] directly at [phone].  Normal or non-critical lab and imaging results will be communicated to you by TSBhart, letter or phone within 4 business days after the clinic has received the results. If you do not hear from us within 7 days, please contact the clinic through Dasientt or phone. If you have a critical or abnormal lab result, we will notify you by phone as soon as possible.  Submit refill requests through TitanX Engine Cooling or call your pharmacy and they will forward the refill request to us. Please allow 3 business days for your refill to be completed.          Additional Information About Your Visit        MyChart Information     T[employer]ing gives you secure access to your electronic health record. If you see a primary care provider, you can also send messages to your care team and make appointments. If you have questions, please call your primary care clinic.  If you do not have a primary care provider, please call [phone] and they will assist you.        Care EveryWhere ID     This is your Care EveryWhere ID. This could be used by other organizations to access your [facility] medical records  [id number]        Your Vitals Were     Pulse Height Last Period BMI (Body Mass Index)          76 1.588 m (5' 2.5\") 09/17/2018 (Approximate) 21.89 kg/m2         Blood Pressure from Last 3 Encounters:   10/09/18 108/74   08/16/18 112/64   12/14/17 104/62    Weight from Last 3 " Encounters:   10/09/18 55.2 kg (121 lb 9.6 oz)   08/16/18 53.5 kg (118 lb)   12/14/17 54.4 kg (120 lb)              Today, you had the following     No orders found for display         Where to get your medicines      These medications were sent to [id number] - [place], [place] - [address] [place] [address] AT [facility] OF  [facility]  [address] [employer] [address], [place] [address]     Phone:  [phone]     sertraline 50 MG tablet         Some of these will need a paper prescription and others can be bought over the counter.  Ask your nurse if you have questions.     Bring a paper prescription for each of these medications     LORazepam 0.5 MG tablet          Primary Care Provider Office Phone # Fax #    Chico Alvarenga [phone] [address]  [facility] [address]        Equal Access to Services     [facility]: Hadii aad ku hadasho Soomaali, waaxda luqadaha, qaybta kaalmada adeegyada, waxay idiin haylucianon naun galicia . So [facility] [phone].    ATENCIÓN: Si habla español, tiene a meier disposición servicios gratuitos de asistencia lingüística. Llame al [phone].    We comply with applicable federal civil rights laws and [place] laws. We do not discriminate on the basis of race, color, national origin, age, disability, sex, sexual orientation, or gender identity.            Thank you!     Thank you for choosing [facility]  for your care. Our goal is always to provide you with excellent care. Hearing back from our patients is one way we can continue to improve our services. Please take a few minutes to complete the written survey that you may receive in the mail after your visit with us. Thank you!             Your Updated Medication List - Protect others around you: Learn how to safely use, store and throw away your medicines at www.disposemymeds.org.          This list is accurate as of 10/9/18  2:41 PM.  Always use your most recent med list.                    Brand Name Dispense Instructions for use Diagnosis    cephalexin 250 MG capsule    KEFLEX     TK ONE C PO  D FOR 7 DAYS PRN- for acne flare ups        clindamycin 1 % lotion    CLEOCIN T     USE PRN FOR THE FACE        clonazePAM 0.5 MG tablet    klonoPIN          LORazepam 0.5 MG tablet    ATIVAN    20 tablet    Take 1 tablet (0.5 mg) by mouth every 8 hours as needed for anxiety    Adjustment disorder with anxious mood       PRENATAL VITAMIN PO      Take by mouth daily        sertraline 50 MG tablet    ZOLOFT    30 tablet    Take 1 tablet (50 mg) by mouth daily    Anxiety       tretinoin 0.05 % cream    RETIN-A     USE Q OTHER NIGHT AT BEDTIME TO FACE

## 2018-10-09 NOTE — PROGRESS NOTES
"Problem(s) Oriented visit        SUBJECTIVE:                                                    Nan Singleton is a 33 year old female who presents to clinic today for the following health issues :    1. Anxiety  Depression:  Has known history of depression.  Has been on medication for this.  The patient does not report any significant side effects of this medication.  The prior symptoms leading to the original diagnosis and decision to start medication therapy are better.     Appetite is stable.  Sleeping patterns are stable.  No reported thoughts of suicide or homicide.    Last 3 PHQ9 results:  PHQ-9 SCORE 3/27/2017 12/14/2017 8/16/2018 10/9/2018   Total Score - - - -   Total Score 9 4 10 5       - sertraline (ZOLOFT) 50 MG tablet; Take 1 tablet (50 mg) by mouth daily  Dispense: 30 tablet; Refill: 11    2. Adjustment disorder with anxious mood  ANXIETY:  Has known history of anxiety and has been on medication for this.  The patient does not report any significant side effects of this medication.  The prior symptoms leading to the original diagnosis and decision to start medication therapy are better.     Appetite is stable.  Sleeping patterns are stable.    Overall, the level of \"racing thoughts\", emotional lability, and ease of agitation are better.    No recent panic attacks.    IRENE-7 SCORE 9/15/2015 3/27/2017 8/16/2018   Total Score 7 18 11         - LORazepam (ATIVAN) 0.5 MG tablet; Take 1 tablet (0.5 mg) by mouth every 8 hours as needed for anxiety  Dispense: 20 tablet; Refill: 0         Problem list, Medication list, Allergies, and Medical/Social/Surgical histories reviewed in [facility] and updated as appropriate.   Additional history: as documented    ROS:  General and CV completed and negative except as noted above    Histories:   Patient Active Problem List   Diagnosis     Anxiety     No past surgical history on file.    Social History   Substance Use Topics     Smoking status: Never Smoker     Smokeless " "tobacco: Never Used     Alcohol use 2.0 oz/week     4 drink(s) per week     No family history on file.        OBJECTIVE:                                                    /74  Pulse 76  Ht 1.588 m (5' 2.5\")  Wt 55.2 kg (121 lb 9.6 oz)  LMP 09/17/2018 (Approximate)  BMI 21.89 kg/m2  Body mass index is 21.89 kg/(m^2).   APPEARANCE: = Relaxed and in no distress     ASSESSMENT/PLAN:                                                        Nan was seen today for nrf and anxiety.    Diagnoses and all orders for this visit:    Anxiety  -     sertraline (ZOLOFT) 50 MG tablet; Take 1 tablet (50 mg) by mouth daily    Adjustment disorder with anxious mood  -     LORazepam (ATIVAN) 0.5 MG tablet; Take 1 tablet (0.5 mg) by mouth every 8 hours as needed for anxiety        Work on weight loss  Regular exercise    The following health maintenance items are reviewed in Epic and correct as of today:  Health Maintenance   Topic Date Due     HIV SCREEN (SYSTEM ASSIGNED)  02/23/2005     PAP SCREENING Q3 YR (SYSTEM ASSIGNED)  04/01/2015     INFLUENZA VACCINE (1) 09/01/2018     PHQ-9 Q6 MONTHS  02/16/2019     DEPRESSION ACTION PLAN Q1 YR  08/16/2019     TETANUS IMMUNIZATION (SYSTEM ASSIGNED)  04/01/2024       Chico Alvarenga MD  [facility]  Family Practice  [facility]  [phone]    For any issues my office # is [phone]        "

## 2018-10-09 NOTE — LETTER
[facility]  [address] [place] [address]  [phone]      October 9, 2018      Nan Singleton  [address] [place] [address]              Dear Nan      [facility]  [address] [place], [place] [address] [address]  [phone]    Agreement on Controlled Medications    October 9, 2018         Nan Singleton 1985 [id number]      I understand that Chico Alvarenga MD has prescribed controlled substances (narcotics, tranquilizers, and/or stimulants) to help manage my condition(s).  I am taking this medicine to help me function or work.  I know that this is strong medicine.  It could have serious side effects and even cause a dependency on the drug.  If I stop these medicines suddenly, I could have severe withdrawal symptoms.    The risks, benefits, and side effects of these medication(s) were explained to me.  I agree that:  1. I will take part in other treatments as advised by my provider.  This may be psychiatry or counseling, physical therapy, behavioral therapy, group treatment, or a referral to a pain clinic.  I will reduce or stop my medicine when my provider tells me to do so.   2. I will take my medicines as prescribed.  I will not change the dose or schedule unless my provider tells me to.  There will be no refills if I  run out early.   I may be contacted at any time without warning and asked to complete a drug test or pill count.   3. [facility] requires a medication follow up appointment with Chico Alvarenga MD  every three months. The medication prescribed for you requires a written prescription script; this script will be given to you at your scheduled medication follow up visits and you will receive three one month written scripts for your medication. There may be times when your medication will need a refill between appointments. Refills will be made only during regular office hours. Refills will not be made at night, on weekends or during  holidays. It is your responsibility to make appointment arrangements 7-10 days before your medication runs out.  You may be given a refill for 5-7 day quantity if your provider is out of the office for an extended period to last until they return.    4. Only Chico Alvarenga MD of  [facility] will be prescribing all controlled substance medications for chronic pain and other medication. We expect that you will not accept or seek controlled substance medications from other providers, Emergency Room or Urgent Care.  5. I will use one pharmacy to fill all of my controlled substance prescriptions.  If my prescription is mailed to my pharmacy, it may take 5 to 7 days for my medicine to be ready. Pharmacy:   6. [facility] [address] [place] [address] [address] [facility] [address]  7. [address]  8. [facility] [address]  9. Phone: [phone] Fax: [phone]  10.   11. I understand that my provider, clinic care team, and pharmacy can track controlled substance prescriptions from other providers through a central database (prescription monitoring program).  12. I will bring in my list of medications (or my medicine bottles) each time I come to the clinic.  13. Refills of controlled substances will be made only during office hours.  It is up to me to make sure that I do not run out of my medicines on weekends or holidays.     14. I am responsible for my prescriptions.  If the medicine is lost or stolen, it will not be replaced.   I also agree not to share these medicines with anyone.  15. I agree to not use ANY illegal or recreational drugs.  This includes marijuana, cocaine, bath salts or other drugs.  I agree not to use alcohol unless my provider says I may.  I agree to give urine samples whenever asked.  If I fail to give a urine sample, the provider may stop my medicine.    16. I understand that this medicine can affect my thinking and judgment.  It may be unsafe for me to  drive, use machinery and do dangerous tasks.  I will not do any of these things until I know how the medicine affects me.  If my dose changes, I will wait to see how it affects me.  I will contact my provider if I have concerns about medicine side effects.   17. We have agreed to use the following medications on a regular scheduled basis to control pain: to be discussed  18. We have agreed to use the following medications for breakthrough pain:to be discussed  19. 10.  I will notify my physician if I become pregnant or am trying to become pregnant.     I understand that if I do not follow any of the conditions above, my prescriptions or treatment may be stopped.    I agree that my provider, clinic care team, and pharmacy may work with any city, state or federal law enforcement agency that investigates the misuse, sale, or other diversion of my controlled medicine. I will allow my provider to discuss my care with or share a copy of this agreement with any other treating provider, pharmacy or emergency room where I receive care.  I agree to give up (waive) any right of privacy or confidentiality with respect to these authorizations.   I have read this agreement and have asked questions about anything I did not understand.   ___________________________________    ___________________________  Nan Singleton                                                          October 9, 2018  ___________________________________     ____________________________  Chico Alvarenga MD                                                     October 9, 2018          Sincerely,    Chico Alvarenga M.D.

## 2018-10-10 ASSESSMENT — PATIENT HEALTH QUESTIONNAIRE - PHQ9: SUM OF ALL RESPONSES TO PHQ QUESTIONS 1-9: 5

## 2019-03-09 LAB
ABO + RH BLD: NORMAL
ABO + RH BLD: NORMAL
BLD GP AB SCN SERPL QL: NORMAL
RUBELLA ABY IGG: 6.16
RUBELLA ANTIBODY IGG QUANTITATIVE: NORMAL IU/ML

## 2019-09-21 LAB — GROUP B STREP PCR: NORMAL

## 2019-10-01 ENCOUNTER — HEALTH MAINTENANCE LETTER (OUTPATIENT)
Age: 34
End: 2019-10-01

## 2019-10-04 ENCOUNTER — HOSPITAL ENCOUNTER (OUTPATIENT)
Facility: CLINIC | Age: 34
Discharge: HOME | End: 2019-10-04
Attending: OBSTETRICS & GYNECOLOGY | Admitting: OBSTETRICS & GYNECOLOGY
Payer: COMMERCIAL

## 2019-10-04 ENCOUNTER — HOSPITAL ENCOUNTER (INPATIENT)
Facility: CLINIC | Age: 34
LOS: 3 days | Discharge: HOME | End: 2019-10-07
Attending: OBSTETRICS & GYNECOLOGY | Admitting: OBSTETRICS & GYNECOLOGY
Payer: COMMERCIAL

## 2019-10-04 ENCOUNTER — ANESTHESIA (OUTPATIENT)
Dept: OBGYN | Facility: CLINIC | Age: 34
End: 2019-10-04
Payer: COMMERCIAL

## 2019-10-04 ENCOUNTER — ANESTHESIA EVENT (OUTPATIENT)
Dept: OBGYN | Facility: CLINIC | Age: 34
End: 2019-10-04
Payer: COMMERCIAL

## 2019-10-04 LAB
ABO + RH BLD: NORMAL
ABO + RH BLD: NORMAL
SPECIMEN EXP DATE BLD: NORMAL
T PALLIDUM AB SER QL: NONREACTIVE

## 2019-10-04 PROCEDURE — 25000125 ZZHC RX 250: Performed by: OBSTETRICS & GYNECOLOGY

## 2019-10-04 PROCEDURE — 86900 BLOOD TYPING SEROLOGIC ABO: CPT | Performed by: OBSTETRICS & GYNECOLOGY

## 2019-10-04 PROCEDURE — 25800030 ZZH RX IP 258 OP 636: Performed by: OBSTETRICS & GYNECOLOGY

## 2019-10-04 PROCEDURE — 10907ZC DRAINAGE OF AMNIOTIC FLUID, THERAPEUTIC FROM PRODUCTS OF CONCEPTION, VIA NATURAL OR ARTIFICIAL OPENING: ICD-10-PCS | Performed by: OBSTETRICS & GYNECOLOGY

## 2019-10-04 PROCEDURE — 3E0S3BZ INTRODUCTION OF ANESTHETIC AGENT INTO EPIDURAL SPACE, PERCUTANEOUS APPROACH: ICD-10-PCS | Performed by: ANESTHESIOLOGY

## 2019-10-04 PROCEDURE — 25000128 H RX IP 250 OP 636: Performed by: ANESTHESIOLOGY

## 2019-10-04 PROCEDURE — 37000011 ZZH ANESTHESIA WARD SERVICE

## 2019-10-04 PROCEDURE — 36415 COLL VENOUS BLD VENIPUNCTURE: CPT | Performed by: OBSTETRICS & GYNECOLOGY

## 2019-10-04 PROCEDURE — 12000000 ZZH R&B MED SURG/OB

## 2019-10-04 PROCEDURE — 00HU33Z INSERTION OF INFUSION DEVICE INTO SPINAL CANAL, PERCUTANEOUS APPROACH: ICD-10-PCS | Performed by: ANESTHESIOLOGY

## 2019-10-04 PROCEDURE — 25000128 H RX IP 250 OP 636: Performed by: OBSTETRICS & GYNECOLOGY

## 2019-10-04 PROCEDURE — 86780 TREPONEMA PALLIDUM: CPT | Performed by: OBSTETRICS & GYNECOLOGY

## 2019-10-04 PROCEDURE — 86901 BLOOD TYPING SEROLOGIC RH(D): CPT | Performed by: OBSTETRICS & GYNECOLOGY

## 2019-10-04 PROCEDURE — 25000125 ZZHC RX 250: Performed by: ANESTHESIOLOGY

## 2019-10-04 RX ORDER — FENTANYL/BUPIVACAINE/NS/PF 2-1250MCG
12 PLASTIC BAG, INJECTION (ML) INJECTION CONTINUOUS
Status: DISCONTINUED | OUTPATIENT
Start: 2019-10-04 | End: 2019-10-05

## 2019-10-04 RX ORDER — FENTANYL CITRATE 50 UG/ML
100 INJECTION, SOLUTION INTRAMUSCULAR; INTRAVENOUS ONCE
Status: COMPLETED | OUTPATIENT
Start: 2019-10-04 | End: 2019-10-04

## 2019-10-04 RX ORDER — OXYTOCIN/0.9 % SODIUM CHLORIDE 30/500 ML
100-340 PLASTIC BAG, INJECTION (ML) INTRAVENOUS CONTINUOUS PRN
Status: COMPLETED | OUTPATIENT
Start: 2019-10-04 | End: 2019-10-05

## 2019-10-04 RX ORDER — EPHEDRINE SULFATE 50 MG/ML
5 INJECTION, SOLUTION INTRAMUSCULAR; INTRAVENOUS; SUBCUTANEOUS
Status: DISCONTINUED | OUTPATIENT
Start: 2019-10-04 | End: 2019-10-05

## 2019-10-04 RX ORDER — FENTANYL CITRATE 50 UG/ML
50-100 INJECTION, SOLUTION INTRAMUSCULAR; INTRAVENOUS
Status: DISCONTINUED | OUTPATIENT
Start: 2019-10-04 | End: 2019-10-05

## 2019-10-04 RX ORDER — NALBUPHINE HYDROCHLORIDE 10 MG/ML
2.5-5 INJECTION, SOLUTION INTRAMUSCULAR; INTRAVENOUS; SUBCUTANEOUS EVERY 6 HOURS PRN
Status: DISCONTINUED | OUTPATIENT
Start: 2019-10-04 | End: 2019-10-05

## 2019-10-04 RX ORDER — OXYTOCIN 10 [USP'U]/ML
10 INJECTION, SOLUTION INTRAMUSCULAR; INTRAVENOUS
Status: DISCONTINUED | OUTPATIENT
Start: 2019-10-04 | End: 2019-10-05

## 2019-10-04 RX ORDER — SODIUM CHLORIDE, SODIUM LACTATE, POTASSIUM CHLORIDE, CALCIUM CHLORIDE 600; 310; 30; 20 MG/100ML; MG/100ML; MG/100ML; MG/100ML
INJECTION, SOLUTION INTRAVENOUS CONTINUOUS
Status: DISCONTINUED | OUTPATIENT
Start: 2019-10-04 | End: 2019-10-05

## 2019-10-04 RX ORDER — LIDOCAINE HYDROCHLORIDE AND EPINEPHRINE 15; 5 MG/ML; UG/ML
INJECTION, SOLUTION EPIDURAL PRN
Status: DISCONTINUED | OUTPATIENT
Start: 2019-10-04 | End: 2019-10-05 | Stop reason: HOSPADM

## 2019-10-04 RX ORDER — ONDANSETRON 2 MG/ML
4 INJECTION INTRAMUSCULAR; INTRAVENOUS EVERY 6 HOURS PRN
Status: DISCONTINUED | OUTPATIENT
Start: 2019-10-04 | End: 2019-10-05

## 2019-10-04 RX ORDER — NALBUPHINE HYDROCHLORIDE 10 MG/ML
10-20 INJECTION, SOLUTION INTRAMUSCULAR; INTRAVENOUS; SUBCUTANEOUS
Status: DISCONTINUED | OUTPATIENT
Start: 2019-10-04 | End: 2019-10-05

## 2019-10-04 RX ORDER — OXYCODONE AND ACETAMINOPHEN 5; 325 MG/1; MG/1
1 TABLET ORAL
Status: DISCONTINUED | OUTPATIENT
Start: 2019-10-04 | End: 2019-10-05

## 2019-10-04 RX ORDER — ONDANSETRON 4 MG/1
4 TABLET, ORALLY DISINTEGRATING ORAL EVERY 6 HOURS PRN
Status: DISCONTINUED | OUTPATIENT
Start: 2019-10-04 | End: 2019-10-05

## 2019-10-04 RX ORDER — ROPIVACAINE HYDROCHLORIDE 2 MG/ML
10 INJECTION, SOLUTION EPIDURAL; INFILTRATION; PERINEURAL ONCE
Status: COMPLETED | OUTPATIENT
Start: 2019-10-04 | End: 2019-10-04

## 2019-10-04 RX ORDER — ACETAMINOPHEN 325 MG/1
650 TABLET ORAL EVERY 4 HOURS PRN
Status: DISCONTINUED | OUTPATIENT
Start: 2019-10-04 | End: 2019-10-05

## 2019-10-04 RX ORDER — NALOXONE HYDROCHLORIDE 0.4 MG/ML
.1-.4 INJECTION, SOLUTION INTRAMUSCULAR; INTRAVENOUS; SUBCUTANEOUS
Status: DISCONTINUED | OUTPATIENT
Start: 2019-10-04 | End: 2019-10-05

## 2019-10-04 RX ORDER — CARBOPROST TROMETHAMINE 250 UG/ML
250 INJECTION, SOLUTION INTRAMUSCULAR
Status: DISCONTINUED | OUTPATIENT
Start: 2019-10-04 | End: 2019-10-05

## 2019-10-04 RX ORDER — OXYTOCIN/0.9 % SODIUM CHLORIDE 30/500 ML
1-24 PLASTIC BAG, INJECTION (ML) INTRAVENOUS CONTINUOUS
Status: DISCONTINUED | OUTPATIENT
Start: 2019-10-04 | End: 2019-10-05

## 2019-10-04 RX ORDER — METHYLERGONOVINE MALEATE 0.2 MG/ML
200 INJECTION INTRAVENOUS
Status: DISCONTINUED | OUTPATIENT
Start: 2019-10-04 | End: 2019-10-05

## 2019-10-04 RX ORDER — IBUPROFEN 400 MG/1
800 TABLET, FILM COATED ORAL
Status: DISCONTINUED | OUTPATIENT
Start: 2019-10-04 | End: 2019-10-05

## 2019-10-04 RX ORDER — LIDOCAINE 40 MG/G
CREAM TOPICAL
Status: DISCONTINUED | OUTPATIENT
Start: 2019-10-04 | End: 2019-10-05

## 2019-10-04 RX ORDER — DOXYLAMINE SUCCINATE AND PYRIDOXINE HYDROCHLORIDE, DELAYED RELEASE TABLETS 10 MG/10 MG 10; 10 MG/1; MG/1
10 TABLET, DELAYED RELEASE ORAL
COMMUNITY

## 2019-10-04 RX ADMIN — ROPIVACAINE HYDROCHLORIDE 10 ML: 2 INJECTION, SOLUTION EPIDURAL; INFILTRATION at 15:05

## 2019-10-04 RX ADMIN — Medication 2 MILLI-UNITS/MIN: at 09:29

## 2019-10-04 RX ADMIN — SODIUM CHLORIDE, POTASSIUM CHLORIDE, SODIUM LACTATE AND CALCIUM CHLORIDE: 600; 310; 30; 20 INJECTION, SOLUTION INTRAVENOUS at 21:57

## 2019-10-04 RX ADMIN — SODIUM CHLORIDE, POTASSIUM CHLORIDE, SODIUM LACTATE AND CALCIUM CHLORIDE: 600; 310; 30; 20 INJECTION, SOLUTION INTRAVENOUS at 15:47

## 2019-10-04 RX ADMIN — Medication 12 ML/HR: at 15:10

## 2019-10-04 RX ADMIN — FENTANYL CITRATE 100 MCG: 50 INJECTION INTRAMUSCULAR; INTRAVENOUS at 13:02

## 2019-10-04 RX ADMIN — SODIUM CHLORIDE, POTASSIUM CHLORIDE, SODIUM LACTATE AND CALCIUM CHLORIDE 1000 ML: 600; 310; 30; 20 INJECTION, SOLUTION INTRAVENOUS at 14:26

## 2019-10-04 RX ADMIN — Medication 12 ML/HR: at 23:01

## 2019-10-04 RX ADMIN — ONDANSETRON 4 MG: 2 INJECTION INTRAMUSCULAR; INTRAVENOUS at 23:33

## 2019-10-04 RX ADMIN — FENTANYL CITRATE 100 MCG: 50 INJECTION, SOLUTION INTRAMUSCULAR; INTRAVENOUS at 15:05

## 2019-10-04 RX ADMIN — LIDOCAINE HYDROCHLORIDE AND EPINEPHRINE 3 ML: 15; 5 INJECTION, SOLUTION EPIDURAL at 15:00

## 2019-10-04 RX ADMIN — SODIUM CHLORIDE, POTASSIUM CHLORIDE, SODIUM LACTATE AND CALCIUM CHLORIDE: 600; 310; 30; 20 INJECTION, SOLUTION INTRAVENOUS at 09:16

## 2019-10-04 ASSESSMENT — ENCOUNTER SYMPTOMS: SEIZURES: 0

## 2019-10-04 ASSESSMENT — MIFFLIN-ST. JEOR: SCORE: 1357.25

## 2019-10-04 NOTE — ANESTHESIA PREPROCEDURE EVALUATION
"Anesthesia Pre-Procedure Evaluation    Patient: Nan Peck   MRN: [id number] : 1985          Preoperative Diagnosis: * No surgery found *        Past Medical History:   Diagnosis Date     Depressive disorder     and anxiety     Past Surgical History:   Procedure Laterality Date     ENT SURGERY      wisdom teeth       Anesthesia Evaluation       history and physical reviewed .      No history of anesthetic complications          ROS/MED HX    ENT/Pulmonary:  - neg pulmonary ROS    (-) asthma, sleep apnea and recent URI   Neurologic:  - neg neurologic ROS    (-) seizures   Cardiovascular:  - neg cardiovascular ROS      (-) hypertension   METS/Exercise Tolerance:  >4 METS   Hematologic:  - neg hematologic  ROS       Musculoskeletal:  - neg musculoskeletal ROS       GI/Hepatic:  - neg GI/hepatic ROS   (+) GERD       Renal/Genitourinary:  - ROS Renal section negative       Endo:  - neg endo ROS       Psychiatric:     (+) psychiatric history anxiety and depression      Infectious Disease:  - neg infectious disease ROS       Malignancy:      - no malignancy   Other:                                 No results found for: WBC, HGB, HCT, PLT, CRP, SED, NA, POTASSIUM, CHLORIDE, CO2, BUN, CR, GLC, GETACHEW, PHOS, MAG, ALBUMIN, PROTTOTAL, ALT, AST, GGT, ALKPHOS, BILITOTAL, BILIDIRECT, LIPASE, AMYLASE, DALIA, PTT, INR, FIBR, TSH, T4, T3, HCG, HCGS, CKTOTAL, CKMB, TROPN    Preop Vitals  BP Readings from Last 3 Encounters:   10/04/19 116/85   10/09/18 108/74   18 112/64    Pulse Readings from Last 3 Encounters:   10/04/19 98   10/09/18 76   18 91      Resp Readings from Last 3 Encounters:   10/04/19 18   18 16   17 16    SpO2 Readings from Last 3 Encounters:   18 99%   17 99%   17 97%      Temp Readings from Last 1 Encounters:   10/04/19 36.3  C (97.4  F) (Temporal)    Ht Readings from Last 1 Encounters:   10/04/19 1.575 m (5' 2\")      Wt Readings from Last 1 Encounters: " "  10/04/19 69.4 kg (153 lb)    Estimated body mass index is 27.98 kg/m  as calculated from the following:    Height as of this encounter: 1.575 m (5' 2\").    Weight as of this encounter: 69.4 kg (153 lb).       Anesthesia Plan      History & Physical Review      ASA Status:  2 .  OB Epidural Asa: 2       Plan for     Elective induction of labor      Postoperative Care      Consents  Anesthetic plan, risks, benefits and alternatives discussed with:  Patient..                 Sim Suarez MD  "

## 2019-10-04 NOTE — PROGRESS NOTES
Patient is feeling contractions in her back    [place] q 2-3  Cat 1  80/3/-2  AROM clear    Epidural prn  Expect

## 2019-10-04 NOTE — PLAN OF CARE
Data: Patient admitted to room 2220 at 0820. Patient is a . Prenatal record reviewed.   OB History    Para Term  AB Living   1 0 0 0 0 0   SAB TAB Ectopic Multiple Live Births   0 0 0 0 0      # Outcome Date GA Lbr Siddhartha/2nd Weight Sex Delivery Anes PTL Lv   1 Current            .  Medical History:   Past Medical History:   Diagnosis Date     Depressive disorder     and anxiety   .  Gestational age 39w6d. Vital signs per doc flowsheet. Fetal movement present. Patient reports Induction Of Labor   as reason for admission. Support persons Niraj present.  Action:  Care of patient assumed at 0820. Verbal consent for EFM, external fetal monitors applied. Admission assessment completed. Patient and support persons educated on labor process. Patient instructed to report change in fetal movement, contractions, vaginal leaking of fluid or bleeding, abdominal pain, or any concerns related to the pregnancy to her nurse/physician. Patient oriented to room, call light in reach.   Response: Dr. Everett at bedside at 0830. Plan per provider is Tried to AROM patient but unable to, so pitocin to be started and MD will try and AROM patient at noon. Patient verbalized understanding of education and verbalized agreement with plan. Patient coping with labor via epidural when needed..

## 2019-10-04 NOTE — PLAN OF CARE
1445 Dr Erick Harkins at patient bedside, epidural procedure explained per ZANDER, consent for epidural signed by MDA, patient and RN. Patient sat up over side of bed, time out completed and ropivacaine 10cc vial and fentanyl 2cc vial handed off to ZANDER.

## 2019-10-04 NOTE — PROVIDER NOTIFICATION
10/04/19 9030   Comments   Comments MD at bedside, ABHAY with attwmpted AROM, unsuccessful, plan per MD start pitcin and will try to AROM about noon. Patient and spouse in agreement with plan

## 2019-10-04 NOTE — PLAN OF CARE
Patient initially admitted at 0740 under maiden name, was computer dischared and readmitted under  name by 0830.

## 2019-10-04 NOTE — ANESTHESIA PROCEDURE NOTES
Peripheral nerve/Neuraxial procedure note : epidural catheter  Pre-Procedure  Performed by Sim Suarez MD  Location: OB      Pre-Anesthestic Checklist: patient identified, IV checked, site marked, risks and benefits discussed, informed consent, monitors and equipment checked, pre-op evaluation and at physician/surgeon's request    Timeout  Correct Patient: Yes   Correct Procedure: Yes   Correct Site: Yes   Correct Laterality: Yes   Correct Position: Yes   Site Marked: Yes   .   Procedure Documentation    .    Procedure: epidural catheter, .   Patient Position:sitting Insertion Site:L3-4  (midline approach) Injection technique: LORT saline and LORT air   Local skin infiltrated with 1 mL of 1% lidocaine.      Patient Prep/Sterile Barriers; mask, sterile gloves, povidone-iodine 7.5% surgical scrub, patient draped.  .  Needle: Touhy needle   Needle Gauge: 17.    Needle Length (Inches) 3.5   # of attempts: 1 and # of redirects:  .    Catheter: 19 G . .  Catheter threaded easily  .  .   .    Assessment/Narrative  Paresthesias: No.  .  .  Aspiration negative for heme or CSF  . Test dose of 3 mL lidocaine 1.5% w/ 1:200,000 epinephrine at. Test dose negative for signs of intravascular, subdural or intrathecal injection. Comments:  Pre-procedure time out completed. Patient in sitting position, the lumbar spine was prepped and draped in sterile fashion. The L3/4 interspace was identified and local anesthetic was injected for local skin infiltration. A 17 G touhy needle was advanced to the epidural space which was confirmed with the loss of resistance technique at 4.5 cm. A catheter was then advanced easily into the epidural space. The catheter was left at 8.5 cm at the skin. Negative aspiration of blood and CSF was confirmed. A test dose of 1.5% lidocaine with 1:200,000 epinephrine was injected through the catheter and was negative for intravascular injection. The site was covered with sterile tegaderm and the  catheter was secured with tape.

## 2019-10-04 NOTE — H&P
35 yo  at 39+6 for non medical IOL    [place] occ  Cat 1 tracing  70/1-2/-2 posterior  EFW 7lbs    AROM attempted but unable due to posterior position of cervix    Plan pitocin

## 2019-10-05 PROCEDURE — 12000035 ZZH R&B POSTPARTUM

## 2019-10-05 PROCEDURE — 25000125 ZZHC RX 250: Performed by: OBSTETRICS & GYNECOLOGY

## 2019-10-05 PROCEDURE — 25000128 H RX IP 250 OP 636: Performed by: OBSTETRICS & GYNECOLOGY

## 2019-10-05 PROCEDURE — 0KQM0ZZ REPAIR PERINEUM MUSCLE, OPEN APPROACH: ICD-10-PCS | Performed by: OBSTETRICS & GYNECOLOGY

## 2019-10-05 PROCEDURE — 72200001 ZZH LABOR CARE VAGINAL DELIVERY SINGLE

## 2019-10-05 PROCEDURE — 25000132 ZZH RX MED GY IP 250 OP 250 PS 637: Performed by: OBSTETRICS & GYNECOLOGY

## 2019-10-05 RX ORDER — IBUPROFEN 400 MG/1
800 TABLET, FILM COATED ORAL EVERY 6 HOURS PRN
Status: DISCONTINUED | OUTPATIENT
Start: 2019-10-05 | End: 2019-10-07 | Stop reason: HOSPADM

## 2019-10-05 RX ORDER — AMOXICILLIN 250 MG
2 CAPSULE ORAL 2 TIMES DAILY
Status: DISCONTINUED | OUTPATIENT
Start: 2019-10-05 | End: 2019-10-07 | Stop reason: HOSPADM

## 2019-10-05 RX ORDER — ONDANSETRON 2 MG/ML
4 INJECTION INTRAMUSCULAR; INTRAVENOUS ONCE
Status: COMPLETED | OUTPATIENT
Start: 2019-10-05 | End: 2019-10-05

## 2019-10-05 RX ORDER — OXYTOCIN/0.9 % SODIUM CHLORIDE 30/500 ML
100 PLASTIC BAG, INJECTION (ML) INTRAVENOUS CONTINUOUS
Status: DISCONTINUED | OUTPATIENT
Start: 2019-10-05 | End: 2019-10-07 | Stop reason: HOSPADM

## 2019-10-05 RX ORDER — NALOXONE HYDROCHLORIDE 0.4 MG/ML
.1-.4 INJECTION, SOLUTION INTRAMUSCULAR; INTRAVENOUS; SUBCUTANEOUS
Status: DISCONTINUED | OUTPATIENT
Start: 2019-10-05 | End: 2019-10-07 | Stop reason: HOSPADM

## 2019-10-05 RX ORDER — AMOXICILLIN 250 MG
1 CAPSULE ORAL 2 TIMES DAILY
Status: DISCONTINUED | OUTPATIENT
Start: 2019-10-05 | End: 2019-10-07 | Stop reason: HOSPADM

## 2019-10-05 RX ORDER — OXYTOCIN/0.9 % SODIUM CHLORIDE 30/500 ML
340 PLASTIC BAG, INJECTION (ML) INTRAVENOUS CONTINUOUS PRN
Status: DISCONTINUED | OUTPATIENT
Start: 2019-10-05 | End: 2019-10-07 | Stop reason: HOSPADM

## 2019-10-05 RX ORDER — ACETAMINOPHEN 325 MG/1
650 TABLET ORAL EVERY 4 HOURS PRN
Status: DISCONTINUED | OUTPATIENT
Start: 2019-10-05 | End: 2019-10-07 | Stop reason: HOSPADM

## 2019-10-05 RX ORDER — HYDROCORTISONE 2.5 %
CREAM (GRAM) TOPICAL 3 TIMES DAILY PRN
Status: DISCONTINUED | OUTPATIENT
Start: 2019-10-05 | End: 2019-10-07 | Stop reason: HOSPADM

## 2019-10-05 RX ORDER — LANOLIN 100 %
OINTMENT (GRAM) TOPICAL
Status: DISCONTINUED | OUTPATIENT
Start: 2019-10-05 | End: 2019-10-07 | Stop reason: HOSPADM

## 2019-10-05 RX ORDER — BISACODYL 10 MG
10 SUPPOSITORY, RECTAL RECTAL DAILY PRN
Status: DISCONTINUED | OUTPATIENT
Start: 2019-10-07 | End: 2019-10-07 | Stop reason: HOSPADM

## 2019-10-05 RX ORDER — OXYTOCIN 10 [USP'U]/ML
10 INJECTION, SOLUTION INTRAMUSCULAR; INTRAVENOUS
Status: DISCONTINUED | OUTPATIENT
Start: 2019-10-05 | End: 2019-10-07 | Stop reason: HOSPADM

## 2019-10-05 RX ADMIN — ONDANSETRON 4 MG: 2 INJECTION INTRAMUSCULAR; INTRAVENOUS at 01:18

## 2019-10-05 RX ADMIN — SENNOSIDES AND DOCUSATE SODIUM 1 TABLET: 8.6; 5 TABLET ORAL at 20:26

## 2019-10-05 RX ADMIN — ACETAMINOPHEN 650 MG: 325 TABLET, FILM COATED ORAL at 07:59

## 2019-10-05 RX ADMIN — IBUPROFEN 800 MG: 400 TABLET ORAL at 14:19

## 2019-10-05 RX ADMIN — IBUPROFEN 800 MG: 400 TABLET ORAL at 07:59

## 2019-10-05 RX ADMIN — SERTRALINE HYDROCHLORIDE 50 MG: 50 TABLET ORAL at 20:26

## 2019-10-05 RX ADMIN — ACETAMINOPHEN 650 MG: 325 TABLET, FILM COATED ORAL at 14:19

## 2019-10-05 RX ADMIN — IBUPROFEN 800 MG: 400 TABLET ORAL at 20:26

## 2019-10-05 RX ADMIN — Medication 340 ML/HR: at 06:47

## 2019-10-05 RX ADMIN — ACETAMINOPHEN 650 MG: 325 TABLET, FILM COATED ORAL at 20:26

## 2019-10-05 NOTE — ANESTHESIA POSTPROCEDURE EVALUATION
Patient: Nan Peck    * No procedures listed *    Diagnosis:* No pre-op diagnosis entered *  Diagnosis Additional Information: No value filed.    Anesthesia Type:  No value filed.    Note:  Anesthesia Post Evaluation    Patient location during evaluation: Floor (Postpartum Room)  Patient participation: Able to fully participate in evaluation  Level of consciousness: awake and alert  Pain management: adequate  Airway patency: patent  Cardiovascular status: acceptable and hemodynamically stable  Respiratory status: acceptable, room air and spontaneous ventilation  Hydration status: euvolemic  PONV: none     Anesthetic complications: None    Comments: Denies lower extremity weakness, numbness, or tingling.  Ambulating without difficulty. No complaints/concerns.         Last vitals:  Vitals:    10/05/19 0845 10/05/19 1130 10/05/19 1547   BP: 118/78 130/75 114/67   Pulse:  92 89   Resp:   18   Temp:  36.4  C (97.5  F) 36.6  C (97.9  F)   SpO2: 99%           Electronically Signed By: Sim Suarez MD  October 5, 2019  4:53 PM

## 2019-10-05 NOTE — PROGRESS NOTES
Patient is comfortable with epidural    AFVSS  [place] q 4  120 with moderate variability and accels  90/6/-1    Continue pitocin and position changes

## 2019-10-05 NOTE — PLAN OF CARE
Vital signs stable. Postpartum assessment WDL. Pain controlled with tylenol and ibuprofen. Patient voiding without difficulty. Breastfeeding on cue with assist. Pt expresses pain while feeding so using sheild. Patient and infant bonding well. Will continue with current plan of care.

## 2019-10-05 NOTE — LACTATION NOTE
Initial Lactation visit with Christian [place] & baby Carmelo . Nan attempting to feed at time of visit. Infant fed well after birth, but has been sleepy since initial feeds. Nan is using a nipple shield due to Carmelo's tendency to tongue suck and maternal pain with latch. Infant sleepy at time of visit, placed skin to skin after attempting a feed with shield. Reviewed signs of milk transfer with shield and encouraged use of feeding log. Reviewed normal feeding patterns for first 24 hours. General questions answered regarding feeding, use of breast pumps, and infant behaviors. Encouraged to call for assistance latching infant to breast, especially with use of nipple shield. Recommend unlimited, frequent breast feedings: At least 8 - 12 times every 24 hours. Recommended rooming in. Instructed in hand expression. Avoid pacifiers and supplementation with formula unless medically indicated. Explained benefits of holding baby skin on skin to help promote better breastfeeding outcomes. Nan has a pump for home use. Will revisit as needed.    Edita Woods RN-C, Lactation Educator

## 2019-10-05 NOTE — PLAN OF CARE
Primip inuction of labor at 39.6 weeks gestation. AROM around 1230pm per MD. Epidural for pain relief at 1500. Some cervical change has occurred, continue plan of care.

## 2019-10-05 NOTE — PLAN OF CARE
VSS. Fundus firm and midline. Scant flow. Pain 4/10, taking tylenol and ibuprofen. Breastfeeding. Ambulating free of dizziness or lightheaded. Voiding without difficulty. Encouraged to call with needs, questions, or concerns. Will continue to monitor.

## 2019-10-05 NOTE — PLAN OF CARE
Data: Nan Peck transferred to 4425 via wheelchair at 0925. Baby transferred via parent's arms.  Action: Receiving unit notified of transfer: Yes. Patient and family notified of room change. Report given to Kiran ESPOSITO at 0925. Belongings sent to receiving unit. Accompanied by Registered Nurse. Oriented patient to surroundings. Call light within reach. ID bands double-checked with receiving RN.  Response: Patient tolerated transfer and is stable.

## 2019-10-05 NOTE — PLAN OF CARE
Patient ambulated to bathroom with assist of one nurse, unable to void, pericare instructions given and patient verbalized understanding. Ambulated to wheelchair for transport to 4th floor.

## 2019-10-05 NOTE — L&D DELIVERY NOTE
Delivery Date:  10/05/2019      INDICATIONS:  The patient is a 34-year-old primigravida who presented for a nonmedical induction of labor at 39 weeks and 6 days' gestation.  Upon admission, a reactive nonstress test was obtained.  Her cervix was 2 cm dilated; however, was posterior and artificial rupture of membranes could not be performed.  She was therefore started on Pitocin.  After several hours of maria, artificial rupture of membranes was performed for clear fluid.  Her labor progressed in a normal fashion and she did receive an epidural for pain control.  Upon reaching complete dilation, she was allowed to labor down for approximately 1 hour.  After a 3-hour second stage, she accomplished a vaginal delivery over an intact perineum of a male infant with Apgars 8 and 9.  A cord around the neck x 1 was reduced.  Delayed cord clamping was performed.      A second-degree midline perineal laceration was repaired with 3-0 Vicryl suture.  Quantitative blood loss was 332 mL.  The placenta was delivered and was unremarkable and was discarded.  Infant and mother did well following delivery.         LOLY PEREZ MD             D: 10/05/2019   T: 10/05/2019   MT: LAM      Name:     LULY KHAN   MRN:      [id number]        Account:        [id number]   :      1985        Delivery Date:  10/05/2019               Document: [id number]

## 2019-10-06 PROCEDURE — 25000132 ZZH RX MED GY IP 250 OP 250 PS 637: Performed by: OBSTETRICS & GYNECOLOGY

## 2019-10-06 PROCEDURE — 12000035 ZZH R&B POSTPARTUM

## 2019-10-06 RX ORDER — OXYCODONE HYDROCHLORIDE 5 MG/1
5 TABLET ORAL EVERY 4 HOURS PRN
Status: DISCONTINUED | OUTPATIENT
Start: 2019-10-06 | End: 2019-10-07 | Stop reason: HOSPADM

## 2019-10-06 RX ADMIN — OXYCODONE HYDROCHLORIDE 5 MG: 5 TABLET ORAL at 01:23

## 2019-10-06 RX ADMIN — IBUPROFEN 800 MG: 400 TABLET ORAL at 14:43

## 2019-10-06 RX ADMIN — IBUPROFEN 800 MG: 400 TABLET ORAL at 20:53

## 2019-10-06 RX ADMIN — SERTRALINE HYDROCHLORIDE 50 MG: 50 TABLET ORAL at 20:53

## 2019-10-06 RX ADMIN — ACETAMINOPHEN 650 MG: 325 TABLET, FILM COATED ORAL at 20:53

## 2019-10-06 RX ADMIN — IBUPROFEN 800 MG: 400 TABLET ORAL at 08:35

## 2019-10-06 RX ADMIN — ACETAMINOPHEN 650 MG: 325 TABLET, FILM COATED ORAL at 05:26

## 2019-10-06 RX ADMIN — IBUPROFEN 800 MG: 400 TABLET ORAL at 02:22

## 2019-10-06 RX ADMIN — SENNOSIDES AND DOCUSATE SODIUM 1 TABLET: 8.6; 5 TABLET ORAL at 20:53

## 2019-10-06 RX ADMIN — ACETAMINOPHEN 650 MG: 325 TABLET, FILM COATED ORAL at 14:43

## 2019-10-06 RX ADMIN — SENNOSIDES AND DOCUSATE SODIUM 1 TABLET: 8.6; 5 TABLET ORAL at 08:36

## 2019-10-06 RX ADMIN — ACETAMINOPHEN 650 MG: 325 TABLET, FILM COATED ORAL at 00:41

## 2019-10-06 NOTE — PROVIDER NOTIFICATION
10/06/19 0101   Provider Notification   Provider Name/Title Dr Argueta    Method of Notification Phone   Request Evaluate-Remote   Notification Reason Medication Request   pt very  teary c/o increased pain lower back and abdominal cramping tylenol and motrin no relief MD notified order oxycodone 5 mg every 4 hrs PRN..

## 2019-10-06 NOTE — PROGRESS NOTES
"October 6, 2019      SUBJECTIVE: Late last PM pt has significant abdominal/lower back cramping despite ibuprofen and tylenol, had required oxycodone 5 mg x 1 dose.  This AM states feeling much better.  Mild abdominal cramping. +Lochia light-mod.  Tolerating PO. Ambulating/urinating w/o difficulty.  Denies CP/palp/SOB/LH.    OBJECTIVE:  /72   Pulse 82   Temp 97.5  F (36.4  C) (Oral)   Resp 16   Ht 1.575 m (5' 2\")   Wt 69.4 kg (153 lb)   SpO2 99%   Breastfeeding? Unknown   BMI 27.98 kg/m    Gen: NAD, A&O x 3  Abd: Uterus firm, contracted, NT  Ext: mild edema BL LEs, symmetric, no CT    No results found for: HGB]    A/P: PPD#1 s/p normal vaginal delivery.  Doing well.  Afebrile, VSS.  - ibuprofen, tylenol prn pain  - breastfeeding  - h/o anxiety/depression: continue zoloft 50 mg Qday  - regular diet as tolerated  - routine postpartum care      CHASE RENE MD    "

## 2019-10-06 NOTE — PLAN OF CARE
Vital signs stable. Postpartum assessment WDL. Pain controlled with tylenol,motrin and oxycodone c/o cramping aqua k given  also cold pack for sore lower back pain  Patient voiding without difficulty. Working on Breastfeeding started pumping . Patient and infant bonding well. Will continue with current plan of care.

## 2019-10-06 NOTE — PLAN OF CARE
The pt reports her pain is much better controlled today (using a heating pad for her cramping) and she was encouraged to call if she needs the Oxycodone.  She continues working on breastfeeding with the shield, her son is sleepy at the breast and requires skin to skin stimulation, and a partial staff assist was required for a correct latch.  Pumping was also encouraged

## 2019-10-07 VITALS
WEIGHT: 153 LBS | TEMPERATURE: 97.7 F | HEART RATE: 82 BPM | HEIGHT: 62 IN | SYSTOLIC BLOOD PRESSURE: 128 MMHG | DIASTOLIC BLOOD PRESSURE: 79 MMHG | BODY MASS INDEX: 28.16 KG/M2 | RESPIRATION RATE: 16 BRPM | OXYGEN SATURATION: 99 %

## 2019-10-07 PROCEDURE — 25000132 ZZH RX MED GY IP 250 OP 250 PS 637: Performed by: OBSTETRICS & GYNECOLOGY

## 2019-10-07 RX ORDER — AMOXICILLIN 250 MG
1 CAPSULE ORAL 2 TIMES DAILY
Qty: 60 TABLET | Refills: 1 | Status: SHIPPED | OUTPATIENT
Start: 2019-10-07

## 2019-10-07 RX ADMIN — ACETAMINOPHEN 650 MG: 325 TABLET, FILM COATED ORAL at 07:43

## 2019-10-07 RX ADMIN — IBUPROFEN 800 MG: 400 TABLET ORAL at 03:40

## 2019-10-07 RX ADMIN — ACETAMINOPHEN 650 MG: 325 TABLET, FILM COATED ORAL at 03:40

## 2019-10-07 RX ADMIN — IBUPROFEN 800 MG: 400 TABLET ORAL at 09:45

## 2019-10-07 RX ADMIN — SENNOSIDES AND DOCUSATE SODIUM 2 TABLET: 8.6; 5 TABLET ORAL at 07:43

## 2019-10-07 NOTE — PROGRESS NOTES
Doing well. In bed breast feeding baby with nipple shield. FOB attentive and trying to stimulate the baby to stay awake.    vss afebrile  Abdomen soft and non tender  Fundus firm and nt  Extremities nl    A: PPD 2 doing well        P: discharge today       Precautions reviewed       RTC 6 weeks

## 2019-10-07 NOTE — PLAN OF CARE
VSS. Voiding adequately on own, reminded to void q3h. Scant vag bleeding. Pain controlled w/ ibuprofen and tylenol. Breast feeding well independently overnight, baby sleepy this am.

## 2019-10-07 NOTE — PLAN OF CARE
Vital signs stable. Patient voiding without difficulty. Able to ambulate in room free of dizziness. Taking tylenol/ibuprofen for pain management. Complained of a headache this morning. Headache relieved with tylenol and a warm compress to her neck. Working on breastfeeding infant every 2-3 hours. Planning on discharging home today. Discharge instructions/follow up discussed. Declines need for a breast pump stating she has a new spectra at home. Questions/concerns addressed.

## 2020-01-14 ENCOUNTER — OFFICE VISIT (OUTPATIENT)
Dept: FAMILY MEDICINE | Facility: CLINIC | Age: 35
End: 2020-01-14

## 2020-01-14 VITALS
BODY MASS INDEX: 25.03 KG/M2 | OXYGEN SATURATION: 99 % | HEART RATE: 75 BPM | RESPIRATION RATE: 16 BRPM | SYSTOLIC BLOOD PRESSURE: 102 MMHG | HEIGHT: 62 IN | WEIGHT: 136 LBS | DIASTOLIC BLOOD PRESSURE: 72 MMHG

## 2020-01-14 DIAGNOSIS — F32.0 MILD SINGLE CURRENT EPISODE OF MAJOR DEPRESSIVE DISORDER (H): ICD-10-CM

## 2020-01-14 PROCEDURE — 99214 OFFICE O/P EST MOD 30 MIN: CPT | Performed by: FAMILY MEDICINE

## 2020-01-14 RX ORDER — BUPROPION HYDROCHLORIDE 150 MG/1
150 TABLET ORAL EVERY MORNING
Qty: 30 TABLET | Refills: 4 | Status: SHIPPED | OUTPATIENT
Start: 2020-01-14

## 2020-01-14 RX ORDER — IVERMECTIN 10 MG/G
CREAM TOPICAL DAILY
COMMUNITY

## 2020-01-14 ASSESSMENT — MIFFLIN-ST. JEOR: SCORE: 1280.14

## 2020-01-14 NOTE — PROGRESS NOTES
"Problem(s) Oriented visit      ROS:  General and Resp. completed and negative except as noted above     HISTORY:   reports current alcohol use of about 3.3 standard drinks of alcohol per week.   reports that she has never smoked. She has never used smokeless tobacco.    Past Medical History:   Diagnosis Date     Depressive disorder      Past Surgical History:   Procedure Laterality Date     ENT SURGERY      wisdom teeth       EXAM:  BP: 102/72   Pulse: 75    Temp: Data Unavailable    Wt Readings from Last 2 Encounters:   01/14/20 61.7 kg (136 lb)   10/04/19 69.4 kg (153 lb)       BMI= Body mass index is 24.87 kg/m .    EXAM:  APPEARANCE: = Relaxed and in no distress  Conj/Eyelids = noninjected and lids and lashes are without inflammation  PERRLA/Irises = Pupils Round Reactive to Light and Irisis without inflammation  Ears/Nose = External structures and Nares have usual shape and form  Ear canals and TM's = Canals are not inflammed and have none or little wax and the drums are not injected and have a light reflex   Lips/Teeth/Gums = No lesions seen, good dentition, and gums seem healthy  Oropharynx = No leukoplakia, No injection to the tissues, Normal Uvula  Neck = No anterior or posterior adenopathy appreciated.  Resp effort = Calm regular breathing  Breath Sounds = Good air movement with no rales or rhonchi in any lung fields  Mood/Affect = Cooperative and interested      Assessment/Plan:  Nan was seen today for recheck medication.    Diagnoses and all orders for this visit:    Mild single current episode of major depressive disorder (H)  -     buPROPion (WELLBUTRIN XL) 150 MG 24 hr tablet; Take 1 tablet (150 mg) by mouth every morning        COUNSELING:   reports that she has never smoked. She has never used smokeless tobacco.    Estimated body mass index is 24.87 kg/m  as calculated from the following:    Height as of this encounter: 1.575 m (5' 2\").    Weight as of this encounter: 61.7 kg (136 lb). "       Appropriate preventive services were discussed with this patient, including applicable screening as appropriate for cardiovascular disease, diabetes, osteopenia/osteoporosis, and glaucoma.  As appropriate for age/gender, discussed screening for colorectal cancer, prostate cancer, breast cancer, and cervical cancer. Checklist reviewing preventive services available has been given to the patient.    Reviewed patients plan of care and provided an AVS. The Basic Care Plan (routine screening as documented in Health Maintenance) for Nan meets the Care Plan requirement. This Care Plan has been established and reviewed with the  Patient.      The following health maintenance items are reviewed in Epic and correct as of today:  Health Maintenance   Topic Date Due     PREVENTIVE CARE VISIT  1987     HIV SCREENING  02/23/2002     HPV  02/23/2008     PAP  04/01/2017     INFLUENZA VACCINE (1) 09/01/2019     DTAP/TDAP/TD IMMUNIZATION (9 - Td) 04/01/2024     DEPRESSION ACTION PLAN  Completed     IPV IMMUNIZATION  Completed     MENINGITIS IMMUNIZATION  Completed       Chico Alvarenga  [facility]  For any issues my office # is [phone]

## 2020-01-20 ENCOUNTER — TELEPHONE (OUTPATIENT)
Dept: FAMILY MEDICINE | Facility: CLINIC | Age: 35
End: 2020-01-20

## 2020-02-14 DIAGNOSIS — F41.9 ANXIETY: ICD-10-CM

## 2020-02-27 NOTE — TELEPHONE ENCOUNTER
"1/20/20 patient calls reporting problems since changed from sertraline 50mg to buproprion 150mg QD last week. Reports headaches/\"zaps\" in head, bouts of crying/sadness, short term memory loss.   States had seen Dr. Alvarenga 1/14/20 with frustration regarding struggles with post partum weight loss despite increased exercise and calorie restriction; mood issues too. Baby is 16 weeks old, born in October- formula fed. Patient concerned about sertraline possibly causing resistance to weight loss. [facility] and Dr. Alvarenga both have reassured her post-partum weight loss trajectory is well in normal range, but acknowledged patient's distress about it. Patient admits compares herself to others losing weight more quickly.    She does have an appt with a psych provider specializing in post-partum on 2/16/20. Does also report despite bouts of crying, feels anxiety is better on this med.     Wt Readings from Last 4 Encounters:   01/14/20 61.7 kg (136 lb)   10/04/19 69.4 kg (153 lb)   10/09/18 55.2 kg (121 lb 9.6 oz)   08/16/18 53.5 kg (118 lb)        Reviewed with Denisse De Los Santos CNP and Dr. Gaines (oncall for Dr. Alvarenga)- advised could add sertraline back (25 or 50/day) for 1-2 weeks then decrease again for week and go off or could stay on both till her psych appt.  Discussed, encouraged and reassured at length.    Patient verbalizes understanding and appreciation. She plans to add sertraline back and will call if headache/mood/etc symptoms don't improve.   with adding sertraline back or if any other questions.   Gretta Luevano RN    "

## 2021-01-15 ENCOUNTER — HEALTH MAINTENANCE LETTER (OUTPATIENT)
Age: 36
End: 2021-01-15

## 2021-10-24 ENCOUNTER — HEALTH MAINTENANCE LETTER (OUTPATIENT)
Age: 36
End: 2021-10-24

## 2022-02-13 ENCOUNTER — HEALTH MAINTENANCE LETTER (OUTPATIENT)
Age: 37
End: 2022-02-13

## 2022-10-16 ENCOUNTER — HEALTH MAINTENANCE LETTER (OUTPATIENT)
Age: 37
End: 2022-10-16

## 2023-03-26 ENCOUNTER — HEALTH MAINTENANCE LETTER (OUTPATIENT)
Age: 38
End: 2023-03-26

## 2025-07-26 ENCOUNTER — HEALTH MAINTENANCE LETTER (OUTPATIENT)
Age: 40
End: 2025-07-26